# Patient Record
Sex: FEMALE | Race: BLACK OR AFRICAN AMERICAN | NOT HISPANIC OR LATINO | Employment: FULL TIME | URBAN - METROPOLITAN AREA
[De-identification: names, ages, dates, MRNs, and addresses within clinical notes are randomized per-mention and may not be internally consistent; named-entity substitution may affect disease eponyms.]

---

## 2023-03-16 DIAGNOSIS — E87.6 HYPOKALEMIA: ICD-10-CM

## 2023-03-19 RX ORDER — POTASSIUM CHLORIDE 1500 MG/1
TABLET, EXTENDED RELEASE ORAL
Qty: 90 TABLET | Refills: 0 | Status: SHIPPED | OUTPATIENT
Start: 2023-03-19 | End: 2023-04-03

## 2023-04-03 DIAGNOSIS — E87.6 HYPOKALEMIA: ICD-10-CM

## 2023-04-03 RX ORDER — POTASSIUM CHLORIDE 1500 MG/1
TABLET, EXTENDED RELEASE ORAL
Qty: 270 TABLET | Refills: 0 | Status: SHIPPED | OUTPATIENT
Start: 2023-04-03 | End: 2023-04-17 | Stop reason: SDUPTHER

## 2023-04-17 ENCOUNTER — OFFICE VISIT (OUTPATIENT)
Dept: PRIMARY CARE | Facility: CLINIC | Age: 63
End: 2023-04-17
Payer: COMMERCIAL

## 2023-04-17 VITALS
DIASTOLIC BLOOD PRESSURE: 78 MMHG | BODY MASS INDEX: 31.65 KG/M2 | OXYGEN SATURATION: 98 % | HEART RATE: 78 BPM | SYSTOLIC BLOOD PRESSURE: 118 MMHG | WEIGHT: 190 LBS | HEIGHT: 65 IN

## 2023-04-17 DIAGNOSIS — Z12.31 VISIT FOR SCREENING MAMMOGRAM: ICD-10-CM

## 2023-04-17 DIAGNOSIS — E87.6 HYPOKALEMIA: ICD-10-CM

## 2023-04-17 DIAGNOSIS — Z12.11 ENCOUNTER FOR SCREENING FOR MALIGNANT NEOPLASM OF COLON: ICD-10-CM

## 2023-04-17 DIAGNOSIS — E66.9 OBESITY (BMI 30-39.9): ICD-10-CM

## 2023-04-17 DIAGNOSIS — E55.9 VITAMIN D DEFICIENCY: ICD-10-CM

## 2023-04-17 DIAGNOSIS — I11.0 HYPERTENSIVE HEART DISEASE WITH HEART FAILURE (MULTI): ICD-10-CM

## 2023-04-17 DIAGNOSIS — Z00.00 ANNUAL PHYSICAL EXAM: Primary | ICD-10-CM

## 2023-04-17 DIAGNOSIS — I10 BENIGN ESSENTIAL HTN: ICD-10-CM

## 2023-04-17 PROBLEM — R87.619 ATYPICAL GLANDULAR CELLS OF UNDETERMINED SIGNIFICANCE (AGUS) ON CERVICAL PAP SMEAR: Status: ACTIVE | Noted: 2023-04-17

## 2023-04-17 PROCEDURE — 3078F DIAST BP <80 MM HG: CPT | Performed by: FAMILY MEDICINE

## 2023-04-17 PROCEDURE — 3074F SYST BP LT 130 MM HG: CPT | Performed by: FAMILY MEDICINE

## 2023-04-17 PROCEDURE — 1036F TOBACCO NON-USER: CPT | Performed by: FAMILY MEDICINE

## 2023-04-17 PROCEDURE — 99396 PREV VISIT EST AGE 40-64: CPT | Performed by: FAMILY MEDICINE

## 2023-04-17 RX ORDER — LISINOPRIL AND HYDROCHLOROTHIAZIDE 20; 25 MG/1; MG/1
1 TABLET ORAL DAILY
Qty: 90 TABLET | Refills: 3 | Status: SHIPPED | OUTPATIENT
Start: 2023-04-17 | End: 2024-03-11 | Stop reason: SDUPTHER

## 2023-04-17 RX ORDER — POTASSIUM CHLORIDE 20 MEQ/1
20 TABLET, EXTENDED RELEASE ORAL 3 TIMES DAILY
Qty: 270 TABLET | Refills: 3 | Status: SHIPPED | OUTPATIENT
Start: 2023-04-17 | End: 2024-03-11 | Stop reason: SDUPTHER

## 2023-04-17 RX ORDER — LISINOPRIL AND HYDROCHLOROTHIAZIDE 20; 25 MG/1; MG/1
1 TABLET ORAL DAILY
COMMUNITY
Start: 2016-06-16 | End: 2023-04-17 | Stop reason: SDUPTHER

## 2023-04-17 ASSESSMENT — PATIENT HEALTH QUESTIONNAIRE - PHQ9
SUM OF ALL RESPONSES TO PHQ9 QUESTIONS 1 AND 2: 0
2. FEELING DOWN, DEPRESSED OR HOPELESS: NOT AT ALL
1. LITTLE INTEREST OR PLEASURE IN DOING THINGS: NOT AT ALL

## 2023-04-17 ASSESSMENT — COLUMBIA-SUICIDE SEVERITY RATING SCALE - C-SSRS
6. HAVE YOU EVER DONE ANYTHING, STARTED TO DO ANYTHING, OR PREPARED TO DO ANYTHING TO END YOUR LIFE?: NO
1. IN THE PAST MONTH, HAVE YOU WISHED YOU WERE DEAD OR WISHED YOU COULD GO TO SLEEP AND NOT WAKE UP?: NO
2. HAVE YOU ACTUALLY HAD ANY THOUGHTS OF KILLING YOURSELF?: NO

## 2023-04-17 ASSESSMENT — ENCOUNTER SYMPTOMS
LOSS OF SENSATION IN FEET: 0
DEPRESSION: 0
OCCASIONAL FEELINGS OF UNSTEADINESS: 0

## 2023-04-17 NOTE — PROGRESS NOTES
"Subjective   Patient ID: Megan Wolf is a 62 y.o. female who presents for Annual Exam.    Last Annual Physical: 2021  Last Dental Visit: no recent visit  Last Eye exam: glasses, no recent visit  Hearing Concerns: none  Diet: well balanced  Exercise Routine: regular    Last PAP: Declined, knows she has a hx of abnormal PAP  Last Mammogram: ordered  Last Colonoscopy:  ordered      HPI     Review of Systems  Constitutional: No fever or chills, No Night Sweats  Eyes: No Blurry Vision or Eye sight problems  ENT: No Nasal Discharge, Hoarseness, sore throat  Cardiovascular: no chest pain, no palpitations and no syncope.   Respiratory: no cough, no shortness of breath during exertion and no shortness of breath at rest.   Gastrointestinal: no abdominal pain, no nausea and no vomiting.   : No vaginal discharge, burning with urination, no blood in urine or stools  Skin: No Skin rashes or Lesions  Neuro: No Headache, no dizziness or Numbness or tingling  Psych: No Anxiety, depression or sleeping problems  Heme: No Easy bleeding or brusing.     Objective   /78   Pulse 78   Ht 1.651 m (5' 5\")   Wt 86.2 kg (190 lb)   SpO2 98%   BMI 31.62 kg/m²     Physical Exam  Patient declined Chaperone  Constitutional: Alert and in no acute distress. Well developed, well nourished.   Head and Face: Head and face: Normal.    Eyes: Normal external exam. Pupils were equal in size, round, reactive to light (PERRL) with normal accommodation and extraocular movements intact (EOMI).   Ears, Nose, Mouth, and Throat: External inspection of ears and nose: Normal.  Hearing: Normal.  Nasal mucosa, septum, and turbinates: Normal.  Lips, teeth, and gums: Normal.  Oropharynx: Normal.   Neck: No neck mass was observed. Supple. Thyroid not enlarged and there were no palpable thyroid nodules.   Cardiovascular: Heart rate and rhythm were normal, normal S1 and S2. Pedal pulses: Normal. No peripheral edema.   Pulmonary: No respiratory distress. " "Clear bilateral breath sounds.   Breast: Normal Appearance, No Masses or lumps palpated  Abdomen: Soft nontender; no abdominal mass palpated. Normal bowel sounds. No organomegaly.   : Deferred   Musculoskeletal: No joint swelling seen, normal movements of all extremities. Range of motion: Normal.  Muscle strength/tone: Normal.    Skin: Normal skin color and pigmentation, normal skin turgor, and no rash.   Neurologic: Deep tendon reflexes were 2+ and symmetric.   Psychiatric: Judgment and insight: Intact. Mood and affect: Normal.  Lymphatic: No cervical lymphadenopathy. Palpation of lymph nodes in axillae: Normal.  Palpation of lymph nodes in groin: Normal.    Lab Results   Component Value Date    WBC 3.4 (L) 05/03/2021    HGB 12.8 05/03/2021    HCT 35.8 (L) 05/03/2021     05/03/2021    CHOL 200 (H) 01/28/2020    TRIG 122 01/28/2020    HDL 98.4 01/28/2020    ALT 18 01/28/2020    AST 27 01/28/2020     (L) 05/10/2021    K 4.7 05/10/2021    CL 97 (L) 05/10/2021    CREATININE 0.71 05/10/2021    BUN 19 05/10/2021    CO2 31 05/10/2021    TSH 1.62 01/28/2020       US HEAD NECK SOFT TISSUE  MRN: [id number]  Patient Name: MINERVA SCHAFER     STUDY:  US NECK MASS; ;  2/2/2023 1:48 pm     INDICATION:  posterior neck mass, r/o cyst  R22.1: Lump in neck.     COMPARISON:  None.     ACCESSION NUMBER(S):  [id number]     ORDERING CLINICIAN:  JUAN M LAWRENCE     TECHNIQUE:  Targeted grayscale and color Doppler ultrasound evaluation.     FINDINGS:  Evaluation is targeted to an area of clinical concern as indicated by  the patient in the ultrasound room. This corresponds with an area of  \"lump\" involving the \"midline posterior base of neck soft tissues.\"  In this area there are multiple adjacent abnormal appearing lymph  nodes, largest measures 1.1 x 0.5 x 1.1 cm. No other abnormality in  the area of concern on the submitted images. If any further imaging  evaluation is felt to be clinically indicated, then " a  contrast-enhanced soft tissue neck CT would be the most appropriate  imaging test for further assessment.     IMPRESSION:  As above.         Assessment/Plan   Diagnoses and all orders for this visit:  Annual physical exam  -     Hemoglobin A1C; Future  Obesity (BMI 30-39.9)  Benign essential HTN  -     lisinopriL-hydrochlorothiazide 20-25 mg tablet; Take 1 tablet by mouth once daily.  -     potassium chloride CR (Klor-Con M20) 20 mEq ER tablet; Take 1 tablet (20 mEq) by mouth in the morning and 1 tablet (20 mEq) in the evening and 1 tablet (20 mEq) before bedtime. Do not crush or chew..  -     CBC; Future  -     Comprehensive Metabolic Panel; Future  -     Lipid Panel; Future  -     TSH with reflex to Free T4 if abnormal; Future  Hypokalemia  -     potassium chloride CR (Klor-Con M20) 20 mEq ER tablet; Take 1 tablet (20 mEq) by mouth in the morning and 1 tablet (20 mEq) in the evening and 1 tablet (20 mEq) before bedtime. Do not crush or chew..  Visit for screening mammogram  -     BI mammo bilateral screening tomosynthesis; Future  Encounter for screening for malignant neoplasm of colon  -     Colonoscopy; Future  Vitamin D deficiency  -     Vitamin B12; Future  -     Vitamin D, Total; Future  Hypertensive heart disease with heart failure (CMS/HCC)        Dear Megan Wolf     It was my pleasure to take care of you today in the office. Below are the things we discussed today:    1. 1. Immunizations: Yearly Flu shot is recommended.          a: COVID: Up-to-Date         b: Tetanus: Up-to-date         c: Shingrix: please come back for it     2. Blood Work: ordered  3. Seen your dentist twice a year  4. Yearly Eye exam is recommended    5. BMI: obese  6: Diet recommendations:   Eat Clean, Try to have as many home cooked meals as possible  Avoid processed foods which contain excess calories, sugar, and sodium.    7. Exercise recommendations:   150 minutes a week to maintain your weight     If you have to loose  weight, you need a better diet and exercise plan.     8. Supplements recommended:  a - Calcium 600 mg up to twice a day to get a total of 1200 mg. Each 8 oz of milk or yogurt or 1 oz of cheese, 1 Banana, 1 serving of green Leafy vegetable has about 300 mg of Calcium, so you may subtract that amount. Calcium citrate is the only acceptable supplement to take if you take an acid suppressing medication like Prilosec; otherwise Calcium carbonate is acceptable too (It can cause Constipation).   b - Vitamin D - 2000 IU daily     9. Please get your Living will / Advance directive completed if you do not have one already. Please make sure our office has a copy of the latest one.     10. Colonoscopy: ordered  11. Mammogram: ordered  12. PAP: declined  13. Skin Check: Please see Dermatology once a year for a Skin Check.     14. HTN - Continue Lisinopril/hydrochlorothiazide      Follow up in one year for a Physical. Please call the office before your Physical to see if you need blood work completed prior to your physical.     Please call me if any questions arise from now until your next visit. I will call you after I am done seeing patients. A Doctor is always available by phone when the office is closed. Please feel free to call for help with any problem that you feel shouldn't wait until the office re-opens.     Ale Callejas MD

## 2024-03-11 ENCOUNTER — OFFICE VISIT (OUTPATIENT)
Dept: PRIMARY CARE | Facility: CLINIC | Age: 64
End: 2024-03-11
Payer: COMMERCIAL

## 2024-03-11 VITALS
WEIGHT: 177 LBS | DIASTOLIC BLOOD PRESSURE: 63 MMHG | HEART RATE: 96 BPM | SYSTOLIC BLOOD PRESSURE: 99 MMHG | BODY MASS INDEX: 28.45 KG/M2 | HEIGHT: 66 IN

## 2024-03-11 DIAGNOSIS — E87.6 HYPOKALEMIA: ICD-10-CM

## 2024-03-11 DIAGNOSIS — R73.9 ELEVATED BLOOD SUGAR: ICD-10-CM

## 2024-03-11 DIAGNOSIS — I11.0 HYPERTENSIVE HEART DISEASE WITH HEART FAILURE (MULTI): ICD-10-CM

## 2024-03-11 DIAGNOSIS — I10 BENIGN ESSENTIAL HTN: Primary | ICD-10-CM

## 2024-03-11 DIAGNOSIS — Z12.11 ENCOUNTER FOR SCREENING FOR MALIGNANT NEOPLASM OF COLON: ICD-10-CM

## 2024-03-11 DIAGNOSIS — E55.9 VITAMIN D DEFICIENCY: ICD-10-CM

## 2024-03-11 PROBLEM — E66.9 OBESITY (BMI 30-39.9): Status: RESOLVED | Noted: 2023-04-17 | Resolved: 2024-03-11

## 2024-03-11 PROCEDURE — 3074F SYST BP LT 130 MM HG: CPT | Performed by: FAMILY MEDICINE

## 2024-03-11 PROCEDURE — 1036F TOBACCO NON-USER: CPT | Performed by: FAMILY MEDICINE

## 2024-03-11 PROCEDURE — 3078F DIAST BP <80 MM HG: CPT | Performed by: FAMILY MEDICINE

## 2024-03-11 PROCEDURE — 99214 OFFICE O/P EST MOD 30 MIN: CPT | Performed by: FAMILY MEDICINE

## 2024-03-11 RX ORDER — POTASSIUM CHLORIDE 20 MEQ/1
20 TABLET, EXTENDED RELEASE ORAL 3 TIMES DAILY
Qty: 270 TABLET | Refills: 0 | Status: SHIPPED | OUTPATIENT
Start: 2024-03-11

## 2024-03-11 RX ORDER — LISINOPRIL AND HYDROCHLOROTHIAZIDE 20; 25 MG/1; MG/1
1 TABLET ORAL DAILY
Qty: 90 TABLET | Refills: 0 | Status: SHIPPED | OUTPATIENT
Start: 2024-03-11 | End: 2024-05-28

## 2024-03-11 ASSESSMENT — PATIENT HEALTH QUESTIONNAIRE - PHQ9
SUM OF ALL RESPONSES TO PHQ9 QUESTIONS 1 AND 2: 0
1. LITTLE INTEREST OR PLEASURE IN DOING THINGS: NOT AT ALL
2. FEELING DOWN, DEPRESSED OR HOPELESS: NOT AT ALL

## 2024-03-11 NOTE — PROGRESS NOTES
"Subjective   Patient ID: Megan Wolf is a 63 y.o. female who presents for Follow-up (Blood Pressure).    HPI   The patient reports that she is taking lisinopril- hydrochlorothiazide for her hypertension and her blood pressure is well- controlled at this time. She is also taking potassium supplements for hypokalemia. She denies any lightheadedness of dizziness.     Review of Systems  Constitutional: No fever or chills  Cardiovascular: no chest pain, no palpitations and no syncope.   Respiratory: no cough, no shortness of breath during exertion and no shortness of breath at rest.   Gastrointestinal: no abdominal pain, no nausea and no vomiting.  Neuro: No Headache, no dizziness    Objective   BP 99/63   Pulse 96   Ht 1.676 m (5' 6\")   Wt 80.3 kg (177 lb)   BMI 28.57 kg/m²     Physical Exam  Constitutional: Alert and in no acute distress. Well developed, well nourished  Head and Face: Head and face: Normal.    Cardiovascular: Heart rate and rhythm were normal, normal S1 and S2. No peripheral edema.   Pulmonary: No respiratory distress. Clear bilateral breath sounds.  Musculoskeletal: Gait and station: Normal. Muscle strength/tone: Normal.   Skin: Normal skin color and pigmentation, normal skin turgor, and no rash.    Psychiatric: Judgment and insight: Intact. Mood and affect: Normal.    Lab Results   Component Value Date    WBC 3.4 (L) 05/03/2021    HGB 12.8 05/03/2021    HCT 35.8 (L) 05/03/2021     05/03/2021    CHOL 200 (H) 01/28/2020    TRIG 122 01/28/2020    HDL 98.4 01/28/2020    ALT 18 01/28/2020    AST 27 01/28/2020     (L) 05/10/2021    K 4.7 05/10/2021    CL 97 (L) 05/10/2021    CREATININE 0.71 05/10/2021    BUN 19 05/10/2021    CO2 31 05/10/2021    TSH 1.62 01/28/2020       US HEAD NECK SOFT TISSUE  MRN: [id number]  Patient Name: MEGAN WOLF     STUDY:  US NECK MASS; ;  2/2/2023 1:48 pm     INDICATION:  posterior neck mass, r/o cyst  R22.1: Lump in neck.     COMPARISON:  None.   " "  ACCESSION NUMBER(S):  [id number]     ORDERING CLINICIAN:  JUAN M LAWRENCE     TECHNIQUE:  Targeted grayscale and color Doppler ultrasound evaluation.     FINDINGS:  Evaluation is targeted to an area of clinical concern as indicated by  the patient in the ultrasound room. This corresponds with an area of  \"lump\" involving the \"midline posterior base of neck soft tissues.\"  In this area there are multiple adjacent abnormal appearing lymph  nodes, largest measures 1.1 x 0.5 x 1.1 cm. No other abnormality in  the area of concern on the submitted images. If any further imaging  evaluation is felt to be clinically indicated, then a  contrast-enhanced soft tissue neck CT would be the most appropriate  imaging test for further assessment.     IMPRESSION:  As above.         Assessment/Plan   Diagnoses and all orders for this visit:  Benign essential HTN  -     lisinopriL-hydrochlorothiazide 20-25 mg tablet; Take 1 tablet by mouth once daily.  -     potassium chloride CR 20 mEq ER tablet; Take 1 tablet (20 mEq) by mouth 3 times a day. Do not crush or chew.  -     Albumin , Urine Random; Future  -     CBC; Future  -     Comprehensive Metabolic Panel; Future  -     Lipid Panel; Future  -     Follow Up In [facility] - PCP - Health Maintenance; Future  Hypertensive heart disease with heart failure (CMS/HCC)  -     Lipoprotein a; Future  Hypokalemia  -     potassium chloride CR 20 mEq ER tablet; Take 1 tablet (20 mEq) by mouth 3 times a day. Do not crush or chew.  Vitamin D deficiency  -     Vitamin B12; Future  -     Vitamin D 25-Hydroxy,Total (for eval of Vitamin D levels); Future  Encounter for screening for malignant neoplasm of colon  -     TSH with reflex to Free T4 if abnormal; Future  Elevated blood sugar  -     Hemoglobin A1C; Future        Dear Megan Wolf     It was my pleasure to take care of you today in the office. Below are the things we discussed today:    1. Hypertension: Continue lisinopril- " hydrochlorothiazide.    2. Hypokalemia: Continue potassium supplements.    3. Heart failure from HTN: Resolved.     Your yearly Physical is due in: June 2024  When you call the office for your yearly Physical, please ask them to inform me to order your blood work, so that you can get the fasting blood work before your appointment and we can discuss the results at your physical.      Please call me if any questions arise from now until your next visit. I will call you after I am done seeing patients. A Doctor is always available by phone when the office is closed. Please feel free to call for help with any problem that you feel shouldn't wait until the office re-opens.     Scribe Attestation  By signing my name below, IChacha Scribe   attest that this documentation has been prepared under the direction and in the presence of Ale Callejas MD.

## 2024-04-22 ENCOUNTER — OFFICE VISIT (OUTPATIENT)
Dept: ORTHOPEDIC SURGERY | Facility: CLINIC | Age: 64
End: 2024-04-22
Payer: COMMERCIAL

## 2024-04-22 DIAGNOSIS — M17.11 PRIMARY OSTEOARTHRITIS OF RIGHT KNEE: Primary | ICD-10-CM

## 2024-04-22 PROCEDURE — 20610 DRAIN/INJ JOINT/BURSA W/O US: CPT | Performed by: ORTHOPAEDIC SURGERY

## 2024-04-22 PROCEDURE — 99214 OFFICE O/P EST MOD 30 MIN: CPT | Performed by: ORTHOPAEDIC SURGERY

## 2024-04-22 PROCEDURE — 2500000004 HC RX 250 GENERAL PHARMACY W/ HCPCS (ALT 636 FOR OP/ED): Performed by: ORTHOPAEDIC SURGERY

## 2024-04-22 PROCEDURE — 2500000005 HC RX 250 GENERAL PHARMACY W/O HCPCS: Performed by: ORTHOPAEDIC SURGERY

## 2024-04-22 RX ORDER — TRIAMCINOLONE ACETONIDE 40 MG/ML
1 INJECTION, SUSPENSION INTRA-ARTICULAR; INTRAMUSCULAR
Status: COMPLETED | OUTPATIENT
Start: 2024-04-22 | End: 2024-04-22

## 2024-04-22 RX ORDER — LIDOCAINE HYDROCHLORIDE 10 MG/ML
2 INJECTION INFILTRATION; PERINEURAL
Status: COMPLETED | OUTPATIENT
Start: 2024-04-22 | End: 2024-04-22

## 2024-04-22 RX ADMIN — TRIAMCINOLONE ACETONIDE 1 ML: 40 INJECTION, SUSPENSION INTRA-ARTICULAR; INTRAMUSCULAR at 15:55

## 2024-04-22 RX ADMIN — LIDOCAINE HYDROCHLORIDE 2 ML: 10 INJECTION, SOLUTION INFILTRATION; PERINEURAL at 15:55

## 2024-04-22 NOTE — PROGRESS NOTES
This 63-year-old woman was seen with her mother for continuing pain in her right knee.  The patient notes the pain in her right knee and loss of function is interfering with her activities of daily living.  She notes the pain is quite severe.  Patient notes the intra-articular steroid injection that I gave her a year ago only lasted for several months.  She notes progressive generalized pain and stiffness in the knee.    The patient denies any neurologic symptoms in the lower extremity.    Complicating the patient's situation is her past medical history that includes hypertensive heart disease with heart failure and anemia.  Patient's BMI is 28.57.    Review of systems:  A complete review of the patient's past medical history and review of 30 systems and all medications and allergies was performed. Please see adult medical record for details.    A Family history for genetic or familial inheritance issues and Social history including smoking history, alcohol consumption and exercise activities was also reviewed and significant findings noted in the patients history of present illness.    Physical Exam:  The patient is well-nourished and well-developed and in no acute distress. The patient displayed normal mood and affect. The patient's pupils were equal, round sclerae are white. Patient's respirations appear to be regular and unlabored.     Physical examination of the right knee revealed no effusion in the knee and there were no skin abnormalities or lymphangitis. The knee demonstrated varus alignment. There was no tenderness about the knee, thigh or calf. There was marked tenderness at the medial joint space. There was pain and crepitus with patellofemoral compression. The knee came to within 20 degrees of full extension.  There was pain and crepitus with range of motion of the knee.  Straight leg raising was without lag, flexion was to 95 degrees and ligamentous stability was full. The neurovascular examination  extremity was intact.The neurological exam including motor and sensory exam was performed. The vascular examination including palpation of pulses and capillary refill of the foot was performed and determined to be intact.    Imaging:  I personally reviewed and measured the radiographs including AP and lateral views of the extremity and they revealed severe tricompartmental osteoarthritis with total loss of the medial compartment and varus deformity.    Impression & Plan:   It is my impression this patient has severe osteoarthritis of her knee with marked varus deformity and marked loss of motion.  Unfortunately the patient has lost significant motion over the last year when she could flex to 120 degrees.  She is now limited to 95 degrees and continues with a 20 degree flexion contracture.    I had a long discussion with the patient and her mother.  I would recommend a right total knee replacement.  I have explained to the patient that the best predictor of postop range of motion is preop range of motion and the patient has lost a significant amount of motion in her knee.    The patient understands that we cannot do a total knee replacement if we perform an intra-articular steroid injection today for at least 3 months.  The patient does not want to have her knee surgery until August.    After adequate informed consent I injected the right knee with 40 mg of Kenalog and local anesthesia.    I would recommend we proceed with right total knee replacement utilizing Frandy implants in August.    I talked with the patient at length about risks, limitations, benefits and alternatives to total knee replacement today.  The patient has identified their  personal goals of their joint replacement surgery and recovery and we have discussed them.  The basic concepts of the joint replacement procedure have been reviewed with the patient and the patient has been provided the opportunity to see the implant in the office, in our  educationbal materials or in our preop educational class.  Under my care or the care of previous providers, the patient has had a reasonable trial of nonoperative treatment for their knee problem including NSAIDS, tylenol  or other analgesics, cortisone injections or viscosupplementation,  activity modification and activity restriction and use of assistive devices.  These  previous treatments have not provided the patient with durable relief of their symptoms. The patient is not an appropriate candidate for additional physical therapy at this time. Despite the above, patient has had pain and symptomatic functional impairment that either interferes with their sleep or ADLs and quality of life.  I reviewed concerns about implant wear, loosening, breakage, infection and infection prophylaxis, DVT, PE, death and other medical and anesthetic complications of surgery. We talked about the potential for persistent pain following surgery since there are many possible causes for knee and leg pain. The patient was advised that knee replacement will only relieve pain that is coming from the knee. We talked about limited range of motion following knee replacement and the importance of physical therapy and their motivation to perform their exercises daily even when the knee is painful. We talked about improvements in pain management post-operatively and our accelerated rehab program. We talked about wound healing issues and neurovascular complications of surgery. I reviewed functional and activity restrictions in detail.  We discussed the fact that many of our patients are able to go home the day of surgery or in 1- 2 days depending on their health, mobility, pre-op preparation, individual home situation and personal preference. The patient should take our pre-operative teaching class either electronically or live education. All of the patients questions were answered. The patient can call my office to schedule surgery. I told  the patient that they should contact their primary care physician to discuss fitness for surgery.    Note dictated with [employer] software.  Completed without full type editing and sent to avoid delay.

## 2024-04-22 NOTE — LETTER
April 22, 2024     Ale Callejas MD  [address]  [address]  [facility] [address]    Patient: Megan Wolf   YOB: 1960   Date of Visit: 4/22/2024       Dear Dr. Ale Callejas MD:    Thank you for referring Megan Wolf to me for evaluation. Below are my notes for this consultation.  If you have questions, please do not hesitate to call me. I look forward to following your patient along with you.       Sincerely,     Ian Montero MD      CC: No Recipients  ______________________________________________________________________________________    Patient ID: Megan Wolf is a 63 y.o. female.    L Inj/Asp: R knee on 4/22/2024 3:55 PM  Indications: pain  Details: 20 G needle, anterior approach  Medications: 1 mL triamcinolone acetonide 40 mg/mL; 2 mL lidocaine 10 mg/mL (1 %)  Outcome: tolerated well, no immediate complications  Procedure, treatment alternatives, risks and benefits explained, specific risks discussed. Consent was given by the patient. Patient was prepped and draped in the usual sterile fashion.           This 63-year-old woman was seen with her mother for continuing pain in her right knee.  The patient notes the pain in her right knee and loss of function is interfering with her activities of daily living.  She notes the pain is quite severe.  Patient notes the intra-articular steroid injection that I gave her a year ago only lasted for several months.  She notes progressive generalized pain and stiffness in the knee.    The patient denies any neurologic symptoms in the lower extremity.    Complicating the patient's situation is her past medical history that includes hypertensive heart disease with heart failure and anemia.  Patient's BMI is 28.57.    Review of systems:  A complete review of the patient's past medical history and review of 30 systems and all medications and allergies was performed. Please see adult medical record for details.    A Family history  for genetic or familial inheritance issues and Social history including smoking history, alcohol consumption and exercise activities was also reviewed and significant findings noted in the patients history of present illness.    Physical Exam:  The patient is well-nourished and well-developed and in no acute distress. The patient displayed normal mood and affect. The patient's pupils were equal, round sclerae are white. Patient's respirations appear to be regular and unlabored.     Physical examination of the right knee revealed no effusion in the knee and there were no skin abnormalities or lymphangitis. The knee demonstrated varus alignment. There was no tenderness about the knee, thigh or calf. There was marked tenderness at the medial joint space. There was pain and crepitus with patellofemoral compression. The knee came to within 20 degrees of full extension.  There was pain and crepitus with range of motion of the knee.  Straight leg raising was without lag, flexion was to 95 degrees and ligamentous stability was full. The neurovascular examination extremity was intact.The neurological exam including motor and sensory exam was performed. The vascular examination including palpation of pulses and capillary refill of the foot was performed and determined to be intact.    Imaging:  I personally reviewed and measured the radiographs including AP and lateral views of the extremity and they revealed severe tricompartmental osteoarthritis with total loss of the medial compartment and varus deformity.    Impression & Plan:   It is my impression this patient has severe osteoarthritis of her knee with marked varus deformity and marked loss of motion.  Unfortunately the patient has lost significant motion over the last year when she could flex to 120 degrees.  She is now limited to 95 degrees and continues with a 20 degree flexion contracture.    I had a long discussion with the patient and her mother.  I would recommend  a right total knee replacement.  I have explained to the patient that the best predictor of postop range of motion is preop range of motion and the patient has lost a significant amount of motion in her knee.    The patient understands that we cannot do a total knee replacement if we perform an intra-articular steroid injection today for at least 3 months.  The patient does not want to have her knee surgery until August.    After adequate informed consent I injected the right knee with 40 mg of Kenalog and local anesthesia.    I would recommend we proceed with right total knee replacement utilizing [place] implants in August.    I talked with the patient at length about risks, limitations, benefits and alternatives to total knee replacement today.  The patient has identified their  personal goals of their joint replacement surgery and recovery and we have discussed them.  The basic concepts of the joint replacement procedure have been reviewed with the patient and the patient has been provided the opportunity to see the implant in the office, in our educationbal materials or in our preop educational class.  Under my care or the care of previous providers, the patient has had a reasonable trial of nonoperative treatment for their knee problem including NSAIDS, tylenol  or other analgesics, cortisone injections or viscosupplementation,  activity modification and activity restriction and use of assistive devices.  These  previous treatments have not provided the patient with durable relief of their symptoms. The patient is not an appropriate candidate for additional physical therapy at this time. Despite the above, patient has had pain and symptomatic functional impairment that either interferes with their sleep or ADLs and quality of life.  I reviewed concerns about implant wear, loosening, breakage, infection and infection prophylaxis, DVT, PE, death and other medical and anesthetic complications of surgery. We  talked about the potential for persistent pain following surgery since there are many possible causes for knee and leg pain. The patient was advised that knee replacement will only relieve pain that is coming from the knee. We talked about limited range of motion following knee replacement and the importance of physical therapy and their motivation to perform their exercises daily even when the knee is painful. We talked about improvements in pain management post-operatively and our accelerated rehab program. We talked about wound healing issues and neurovascular complications of surgery. I reviewed functional and activity restrictions in detail.  We discussed the fact that many of our patients are able to go home the day of surgery or in 1- 2 days depending on their health, mobility, pre-op preparation, individual home situation and personal preference. The patient should take our pre-operative teaching class either electronically or live education. All of the patients questions were answered. The patient can call my office to schedule surgery. I told the patient that they should contact their primary care physician to discuss fitness for surgery.    Note dictated with shopandsave software.  Completed without full type editing and sent to avoid delay.

## 2024-04-22 NOTE — PROGRESS NOTES
Patient ID: Megan Wolf is a 63 y.o. female.    L Inj/Asp: R knee on 4/22/2024 3:55 PM  Indications: pain  Details: 20 G needle, anterior approach  Medications: 1 mL triamcinolone acetonide 40 mg/mL; 2 mL lidocaine 10 mg/mL (1 %)  Outcome: tolerated well, no immediate complications  Procedure, treatment alternatives, risks and benefits explained, specific risks discussed. Consent was given by the patient. Patient was prepped and draped in the usual sterile fashion.

## 2024-05-22 PROBLEM — M17.11 UNILATERAL PRIMARY OSTEOARTHRITIS, RIGHT KNEE: Status: ACTIVE | Noted: 2024-05-22

## 2024-05-24 DIAGNOSIS — M17.11 UNILATERAL PRIMARY OSTEOARTHRITIS, RIGHT KNEE: ICD-10-CM

## 2024-05-27 DIAGNOSIS — I10 BENIGN ESSENTIAL HTN: ICD-10-CM

## 2024-05-28 RX ORDER — LISINOPRIL AND HYDROCHLOROTHIAZIDE 20; 25 MG/1; MG/1
1 TABLET ORAL DAILY
Qty: 30 TABLET | Refills: 0 | Status: SHIPPED | OUTPATIENT
Start: 2024-05-28 | End: 2024-05-31

## 2024-05-29 DIAGNOSIS — I10 BENIGN ESSENTIAL HTN: ICD-10-CM

## 2024-05-31 RX ORDER — LISINOPRIL AND HYDROCHLOROTHIAZIDE 20; 25 MG/1; MG/1
1 TABLET ORAL DAILY
Qty: 90 TABLET | Refills: 0 | Status: SHIPPED | OUTPATIENT
Start: 2024-05-31

## 2024-06-17 ENCOUNTER — HOSPITAL ENCOUNTER (EMERGENCY)
Facility: HOSPITAL | Age: 64
Discharge: HOME | End: 2024-06-17
Payer: COMMERCIAL

## 2024-06-17 ENCOUNTER — PHARMACY VISIT (OUTPATIENT)
Dept: PHARMACY | Facility: CLINIC | Age: 64
End: 2024-06-17
Payer: COMMERCIAL

## 2024-06-17 ENCOUNTER — APPOINTMENT (OUTPATIENT)
Dept: RADIOLOGY | Facility: HOSPITAL | Age: 64
End: 2024-06-17
Payer: COMMERCIAL

## 2024-06-17 ENCOUNTER — APPOINTMENT (OUTPATIENT)
Dept: PRIMARY CARE | Facility: CLINIC | Age: 64
End: 2024-06-17
Payer: COMMERCIAL

## 2024-06-17 VITALS
SYSTOLIC BLOOD PRESSURE: 146 MMHG | TEMPERATURE: 97.7 F | DIASTOLIC BLOOD PRESSURE: 87 MMHG | OXYGEN SATURATION: 100 % | RESPIRATION RATE: 16 BRPM | HEART RATE: 80 BPM

## 2024-06-17 DIAGNOSIS — M79.672 LEFT FOOT PAIN: Primary | ICD-10-CM

## 2024-06-17 DIAGNOSIS — M19.079 ARTHRITIS OF BIG TOE: ICD-10-CM

## 2024-06-17 PROCEDURE — 73630 X-RAY EXAM OF FOOT: CPT | Mod: LT

## 2024-06-17 PROCEDURE — 2500000004 HC RX 250 GENERAL PHARMACY W/ HCPCS (ALT 636 FOR OP/ED)

## 2024-06-17 PROCEDURE — 99284 EMERGENCY DEPT VISIT MOD MDM: CPT

## 2024-06-17 PROCEDURE — RXMED WILLOW AMBULATORY MEDICATION CHARGE

## 2024-06-17 PROCEDURE — 73630 X-RAY EXAM OF FOOT: CPT | Mod: LEFT SIDE | Performed by: RADIOLOGY

## 2024-06-17 PROCEDURE — 73610 X-RAY EXAM OF ANKLE: CPT | Mod: LEFT SIDE | Performed by: RADIOLOGY

## 2024-06-17 PROCEDURE — 73610 X-RAY EXAM OF ANKLE: CPT | Mod: LT

## 2024-06-17 PROCEDURE — 96372 THER/PROPH/DIAG INJ SC/IM: CPT

## 2024-06-17 RX ORDER — KETOROLAC TROMETHAMINE 15 MG/ML
15 INJECTION, SOLUTION INTRAMUSCULAR; INTRAVENOUS ONCE
Status: COMPLETED | OUTPATIENT
Start: 2024-06-17 | End: 2024-06-17

## 2024-06-17 RX ORDER — NAPROXEN 500 MG/1
500 TABLET ORAL 2 TIMES DAILY PRN
Qty: 30 TABLET | Refills: 0 | Status: SHIPPED | OUTPATIENT
Start: 2024-06-17

## 2024-06-17 ASSESSMENT — PAIN DESCRIPTION - ORIENTATION: ORIENTATION: LEFT

## 2024-06-17 ASSESSMENT — PAIN DESCRIPTION - PAIN TYPE: TYPE: ACUTE PAIN

## 2024-06-17 ASSESSMENT — PAIN SCALES - GENERAL: PAINLEVEL_OUTOF10: 10 - WORST POSSIBLE PAIN

## 2024-06-17 ASSESSMENT — PAIN - FUNCTIONAL ASSESSMENT: PAIN_FUNCTIONAL_ASSESSMENT: 0-10

## 2024-06-17 ASSESSMENT — PAIN DESCRIPTION - LOCATION: LOCATION: FOOT

## 2024-06-17 NOTE — ED TRIAGE NOTES
Pt complains of left foot pain that started this morning. Pt states that she had no pain in this foot yesterday and was able to ambulate. Pt woke up this morning and was barely able to put any weight on it. Pt denies any fall or injury to the foot. PT has PMHX of HTN

## 2024-06-17 NOTE — DISCHARGE INSTRUCTIONS
X-rays showed no fracture.  There is a moderate amount of arthritis noted in your great toe.  You also have heel spurs noted.  Use naproxen as needed for pain.  If symptoms persist, I did put in a sports medicine referral.

## 2024-06-17 NOTE — ED PROVIDER NOTES
HPI   Chief Complaint   Patient presents with    Foot Injury       Patient is a 63-year-old female with past medical history of HTN and CHF presenting today for left foot pain.  Reports that she worked overtime this weekend both Saturday and Sunday.  Went to bed last night and woke up with acute onset of left foot pain.  Reports that the pain is lateral and anterior pain along her fourth and fifth metatarsals.  No pain along her heel or arch of her foot.  Reports no shooting pain, no numbness or tingling.  Reports that the pain is only present when bearing weight.  She reports no trauma to her foot.  Has never had pain like this before.  Denies any swelling to her left lower extremity                          No data recorded                   Patient History   Past Medical History:   Diagnosis Date    Body mass index (BMI)30.0-30.9, adult 08/27/2021    Body mass index (BMI) of 30.0 to 30.9 in adult    Obesity, unspecified 08/27/2021    Obesity (BMI 30.0-34.9)    Other conditions influencing health status     No significant past medical history     No past surgical history on file.  Family History   Problem Relation Name Age of Onset    No Known Problems Mother      No Known Problems Father       Social History     Tobacco Use    Smoking status: Never     Passive exposure: Never    Smokeless tobacco: Never   Vaping Use    Vaping status: Never Used   Substance Use Topics    Alcohol use: Not Currently    Drug use: Never       Physical Exam   ED Triage Vitals [06/17/24 1146]   Temperature Heart Rate Respirations BP   36.5 °C (97.7 °F) 80 16 146/87      Pulse Ox Temp Source Heart Rate Source Patient Position   100 % Tympanic Monitor Lying      BP Location FiO2 (%)     Right arm --       Physical Exam  Vitals and nursing note reviewed.   Constitutional:       General: She is not in acute distress.     Appearance: Normal appearance. She is not ill-appearing.   HENT:      Head: Normocephalic and atraumatic.      Right Ear:  External ear normal.      Left Ear: External ear normal.      Nose: Nose normal. No congestion or rhinorrhea.      Mouth/Throat:      Mouth: Mucous membranes are moist.      Pharynx: Oropharynx is clear. No oropharyngeal exudate or posterior oropharyngeal erythema.   Eyes:      Extraocular Movements: Extraocular movements intact.      Conjunctiva/sclera: Conjunctivae normal.      Pupils: Pupils are equal, round, and reactive to light.   Cardiovascular:      Rate and Rhythm: Normal rate and regular rhythm.      Heart sounds: Normal heart sounds.   Pulmonary:      Effort: No accessory muscle usage or respiratory distress.      Breath sounds: Normal breath sounds. No wheezing, rhonchi or rales.   Abdominal:      General: Abdomen is flat. Bowel sounds are normal. There is no distension.      Palpations: Abdomen is soft.      Tenderness: There is no abdominal tenderness. There is no right CVA tenderness or left CVA tenderness.   Musculoskeletal:         General: No swelling or deformity. Normal range of motion.      Cervical back: Normal range of motion and neck supple.      Right lower leg: No edema.      Left lower leg: No edema.      Comments: Nontender left foot on palpation, no swelling, pedal pulses palpated 3+, sensation intact.  Reports pain along her fourth and fifth metatarsal though not reproducible.  No pain to palpation of the arch or heel.  No lower extremity swelling in her calf.  No erythema or wounds noted to foot.   Skin:     General: Skin is warm and dry.      Capillary Refill: Capillary refill takes less than 2 seconds.   Neurological:      General: No focal deficit present.      Mental Status: She is alert and oriented to person, place, and time.      GCS: GCS eye subscore is 4. GCS verbal subscore is 5. GCS motor subscore is 6.      Cranial Nerves: Cranial nerves 2-12 are intact.      Sensory: No sensory deficit.      Motor: Motor function is intact. No weakness.   Psychiatric:         Mood and  "Affect: Mood and affect normal.         Speech: Speech normal.         Behavior: Behavior normal. Behavior is cooperative.         ED Course & MDM   Diagnoses as of 06/17/24 1327   Left foot pain   Arthritis of big toe       Medical Decision Making  The patient is a 63-year-old female presenting today for left foot pain.  Reports that she \"overdid it\" at work and was working overtime this weekend.  She states that she felt fine going to bed but when she woke she had increased pain only while walking on her foot along her fourth and fifth metatarsals.  On my exam, no swelling, pulses are intact, sensation is intact.  Lower concern for DVT or arterial occlusion.  No swelling noted to the joint or findings of joint effusion.  Less concern for gout or septic arthritis.  Pain is nonreproducible on my exam.  No pain along the heel or arch to be consistent with plantars fasciitis.  X-rays did show osteoarthritis of the left great toe and findings of Plantar spurs and Achilles enthesophytes.  This is not consistent with her reported pain.  Discussed NSAID trial and follow-up with sports medicine if symptoms do not resolve.  Patient was also given strict return precautions back to the ER.        Procedure  Procedures     Mavis Bond PA-C  06/17/24 1339    "

## 2024-06-17 NOTE — Clinical Note
Megan Wolf was seen and treated in our emergency department on 6/17/2024.  She may return to work on 06/19/2024.       If you have any questions or concerns, please don't hesitate to call.      Mavis Bond PA-C

## 2024-06-18 ENCOUNTER — OFFICE VISIT (OUTPATIENT)
Dept: PRIMARY CARE | Facility: CLINIC | Age: 64
End: 2024-06-18
Payer: COMMERCIAL

## 2024-06-18 ENCOUNTER — LAB (OUTPATIENT)
Dept: LAB | Facility: LAB | Age: 64
End: 2024-06-18
Payer: COMMERCIAL

## 2024-06-18 VITALS — SYSTOLIC BLOOD PRESSURE: 131 MMHG | HEART RATE: 73 BPM | DIASTOLIC BLOOD PRESSURE: 81 MMHG | OXYGEN SATURATION: 98 %

## 2024-06-18 DIAGNOSIS — Z12.11 ENCOUNTER FOR SCREENING FOR MALIGNANT NEOPLASM OF COLON: ICD-10-CM

## 2024-06-18 DIAGNOSIS — E55.9 VITAMIN D DEFICIENCY: ICD-10-CM

## 2024-06-18 DIAGNOSIS — M79.672 LEFT FOOT PAIN: ICD-10-CM

## 2024-06-18 DIAGNOSIS — R73.9 ELEVATED BLOOD SUGAR: ICD-10-CM

## 2024-06-18 DIAGNOSIS — I11.0 HYPERTENSIVE HEART DISEASE WITH HEART FAILURE (MULTI): ICD-10-CM

## 2024-06-18 DIAGNOSIS — I10 BENIGN ESSENTIAL HTN: ICD-10-CM

## 2024-06-18 DIAGNOSIS — M79.672 LEFT FOOT PAIN: Primary | ICD-10-CM

## 2024-06-18 PROCEDURE — 85652 RBC SED RATE AUTOMATED: CPT

## 2024-06-18 PROCEDURE — 36415 COLL VENOUS BLD VENIPUNCTURE: CPT

## 2024-06-18 PROCEDURE — 3079F DIAST BP 80-89 MM HG: CPT | Performed by: FAMILY MEDICINE

## 2024-06-18 PROCEDURE — 1036F TOBACCO NON-USER: CPT | Performed by: FAMILY MEDICINE

## 2024-06-18 PROCEDURE — 80061 LIPID PANEL: CPT

## 2024-06-18 PROCEDURE — 80053 COMPREHEN METABOLIC PANEL: CPT

## 2024-06-18 PROCEDURE — 84550 ASSAY OF BLOOD/URIC ACID: CPT

## 2024-06-18 PROCEDURE — 84443 ASSAY THYROID STIM HORMONE: CPT

## 2024-06-18 PROCEDURE — 86140 C-REACTIVE PROTEIN: CPT

## 2024-06-18 PROCEDURE — 99214 OFFICE O/P EST MOD 30 MIN: CPT | Performed by: FAMILY MEDICINE

## 2024-06-18 PROCEDURE — 85025 COMPLETE CBC W/AUTO DIFF WBC: CPT

## 2024-06-18 PROCEDURE — 82607 VITAMIN B-12: CPT

## 2024-06-18 PROCEDURE — 3075F SYST BP GE 130 - 139MM HG: CPT | Performed by: FAMILY MEDICINE

## 2024-06-18 PROCEDURE — 82306 VITAMIN D 25 HYDROXY: CPT

## 2024-06-18 PROCEDURE — 83036 HEMOGLOBIN GLYCOSYLATED A1C: CPT

## 2024-06-18 PROCEDURE — 82172 ASSAY OF APOLIPOPROTEIN: CPT

## 2024-06-18 RX ORDER — PREDNISONE 5 MG/1
TABLET ORAL
Qty: 30 TABLET | Refills: 0 | Status: SHIPPED | OUTPATIENT
Start: 2024-06-18 | End: 2024-06-18 | Stop reason: DRUGHIGH

## 2024-06-18 RX ORDER — PREDNISONE 5 MG/1
TABLET ORAL
Qty: 30 TABLET | Refills: 0 | Status: SHIPPED | OUTPATIENT
Start: 2024-06-18

## 2024-06-18 ASSESSMENT — PAIN SCALES - GENERAL: PAINLEVEL: 8

## 2024-06-18 NOTE — PROGRESS NOTES
Subjective   Patient ID: Megan Wolf is a 63 y.o. female who presents for Foot Pain (Left).    HPI   The patient was seen in the ER 1 day ago for a sharp shooting pain in her left foot which starts randomly. She had x-rays done which showed arthritis. She was started on Naproxen and has been taking it.     Review of Systems  Constitutional: No fever or chills  Cardiovascular: no chest pain, no palpitations and no syncope.   Respiratory: no cough, no shortness of breath during exertion and no shortness of breath at rest.   Gastrointestinal: no abdominal pain, no nausea and no vomiting.  Neuro: No Headache, no dizziness  MSK: + left foot pain    Objective   /81   Pulse 73   SpO2 98%     Physical Exam  Constitutional: Alert and in no acute distress. Well developed, well nourished  Head and Face: Head and face: Normal.    Cardiovascular: Heart rate and rhythm were normal, normal S1 and S2. No peripheral edema.   Pulmonary: No respiratory distress. Clear bilateral breath sounds.  Musculoskeletal: Gait and station: Normal. Muscle strength/tone: Normal.   Skin: Normal skin color and pigmentation, normal skin turgor, and no rash.    Psychiatric: Judgment and insight: Intact. Mood and affect: Normal.    Lab Results   Component Value Date    WBC 3.4 (L) 05/03/2021    HGB 12.8 05/03/2021    HCT 35.8 (L) 05/03/2021     05/03/2021    CHOL 200 (H) 01/28/2020    TRIG 122 01/28/2020    HDL 98.4 01/28/2020    ALT 18 01/28/2020    AST 27 01/28/2020     (L) 05/10/2021    K 4.7 05/10/2021    CL 97 (L) 05/10/2021    CREATININE 0.71 05/10/2021    BUN 19 05/10/2021    CO2 31 05/10/2021    TSH 1.62 01/28/2020       XR foot left 3+ views, XR ankle left 3+ views  Narrative: Interpreted By:  Rey Ramos,   STUDY:  XR FOOT LEFT 3+ VIEWS; XR ANKLE LEFT 3+ VIEWS; ;  6/17/2024 12:43 pm      INDICATION:  Signs/Symptoms:atraumatic foot pain.      COMPARISON:  None.      ACCESSION NUMBER(S):  [id number];  [id number]      ORDERING CLINICIAN:  LEANDRO FRANCISCO      FINDINGS:  Left ankle and left foot, three views of each hip      No acute fracture seen. No dislocation.      There is moderate joint space narrowing with osteophyte formation the  1st MTP joint. A small plantar calcaneal spurs present. There is a  small Achilles enthesophyte. No soft tissue abnormality seen      Impression: Moderate 1st MTP osteoarthritis. Small calcaneal enthesophytes.  No acute abnormality          MACRO:  None      Signed by: Rey Ramos 6/17/2024 12:56 PM  Dictation workstation:   [id number]      Assessment/Plan   Diagnoses and all orders for this visit:  Left foot pain  -     C-Reactive Protein; Future  -     Sedimentation Rate; Future  -     Uric Acid; Future  -     CBC and Auto Differential; Future  -     Renal Function Panel; Future  -     predniSONE (Deltasone) 5 mg tablet; Take 5 tabs (25 mg) by mouth daily for 2 days, then 4 tabs (20 mg) daily for 2 days, then 3 tabs (15 mg) daily for 2 days, then 2 tabs (10 mg) daily for 2 days, then 1 tab (5 mg) daily for 2 days.        Dear Megan Wolf     It was my pleasure to take care of you today in the office. Below are the things we discussed today:    1. Left foot pain: Blood work ordered. Start Prednisone.    Your yearly Physical is due in: Sept 2024   When you call the office for your yearly Physical, please ask them to inform me to order your blood work, so that you can get the fasting blood work before your appointment and we can discuss the results at your physical.      Please call me if any questions arise from now until your next visit. I will call you after I am done seeing patients. A Doctor is always available by phone when the office is closed. Please feel free to call for help with any problem that you feel shouldn't wait until the office re-opens.     Scribe Attestation  By signing my name below, IChacha Scribe   attest that this documentation has  been prepared under the direction and in the presence of Ale Callejas MD.

## 2024-06-18 NOTE — LETTER
June 18, 2024     Patient: Megan Wolf   YOB: 1960   Date of Visit: 6/18/2024       To Whom It May Concern:    It is my medical opinion that Megan Wolf may return to work on 06/24/2024 .    If you have any questions or concerns, please don't hesitate to call.         Sincerely,        Ale Callejas MD    CC: No Recipients

## 2024-06-19 ENCOUNTER — PHARMACY VISIT (OUTPATIENT)
Dept: PHARMACY | Facility: CLINIC | Age: 64
End: 2024-06-19
Payer: COMMERCIAL

## 2024-06-19 DIAGNOSIS — E53.8 VITAMIN B12 DEFICIENCY: ICD-10-CM

## 2024-06-19 DIAGNOSIS — Z12.31 VISIT FOR SCREENING MAMMOGRAM: Primary | ICD-10-CM

## 2024-06-19 DIAGNOSIS — E55.9 VITAMIN D DEFICIENCY: ICD-10-CM

## 2024-06-19 DIAGNOSIS — I10 BENIGN ESSENTIAL HTN: ICD-10-CM

## 2024-06-19 DIAGNOSIS — E87.6 HYPOKALEMIA: ICD-10-CM

## 2024-06-19 LAB
25(OH)D3 SERPL-MCNC: 9 NG/ML (ref 30–100)
ALBUMIN SERPL BCP-MCNC: 4.5 G/DL (ref 3.4–5)
ALP SERPL-CCNC: 58 U/L (ref 33–136)
ALT SERPL W P-5'-P-CCNC: 17 U/L (ref 7–45)
ANION GAP SERPL CALC-SCNC: 14 MMOL/L (ref 10–20)
AST SERPL W P-5'-P-CCNC: 38 U/L (ref 9–39)
BASOPHILS # BLD AUTO: 0.02 X10*3/UL (ref 0–0.1)
BASOPHILS NFR BLD AUTO: 0.5 %
BILIRUB SERPL-MCNC: 0.5 MG/DL (ref 0–1.2)
BUN SERPL-MCNC: 17 MG/DL (ref 6–23)
CALCIUM SERPL-MCNC: 10 MG/DL (ref 8.6–10.6)
CHLORIDE SERPL-SCNC: 100 MMOL/L (ref 98–107)
CHOLEST SERPL-MCNC: 190 MG/DL (ref 0–199)
CHOLESTEROL/HDL RATIO: 2.2
CO2 SERPL-SCNC: 29 MMOL/L (ref 21–32)
CREAT SERPL-MCNC: 0.64 MG/DL (ref 0.5–1.05)
CRP SERPL-MCNC: 2.41 MG/DL
EGFRCR SERPLBLD CKD-EPI 2021: >90 ML/MIN/1.73M*2
EOSINOPHIL # BLD AUTO: 0.1 X10*3/UL (ref 0–0.7)
EOSINOPHIL NFR BLD AUTO: 2.4 %
ERYTHROCYTE [DISTWIDTH] IN BLOOD BY AUTOMATED COUNT: 11.9 % (ref 11.5–14.5)
ERYTHROCYTE [SEDIMENTATION RATE] IN BLOOD BY WESTERGREN METHOD: 16 MM/H (ref 0–30)
EST. AVERAGE GLUCOSE BLD GHB EST-MCNC: 88 MG/DL
GLUCOSE SERPL-MCNC: 102 MG/DL (ref 74–99)
HBA1C MFR BLD: 4.7 %
HCT VFR BLD AUTO: 40.3 % (ref 36–46)
HDLC SERPL-MCNC: 85 MG/DL
HGB BLD-MCNC: 13.8 G/DL (ref 12–16)
IMM GRANULOCYTES # BLD AUTO: 0 X10*3/UL (ref 0–0.7)
IMM GRANULOCYTES NFR BLD AUTO: 0 % (ref 0–0.9)
LDLC SERPL CALC-MCNC: 86 MG/DL
LYMPHOCYTES # BLD AUTO: 1.59 X10*3/UL (ref 1.2–4.8)
LYMPHOCYTES NFR BLD AUTO: 38 %
MCH RBC QN AUTO: 33.3 PG (ref 26–34)
MCHC RBC AUTO-ENTMCNC: 34.2 G/DL (ref 32–36)
MCV RBC AUTO: 97 FL (ref 80–100)
MONOCYTES # BLD AUTO: 0.31 X10*3/UL (ref 0.1–1)
MONOCYTES NFR BLD AUTO: 7.4 %
NEUTROPHILS # BLD AUTO: 2.16 X10*3/UL (ref 1.2–7.7)
NEUTROPHILS NFR BLD AUTO: 51.7 %
NON HDL CHOLESTEROL: 105 MG/DL (ref 0–149)
NRBC BLD-RTO: 0 /100 WBCS (ref 0–0)
PLATELET # BLD AUTO: 232 X10*3/UL (ref 150–450)
POTASSIUM SERPL-SCNC: 3.1 MMOL/L (ref 3.5–5.3)
PROT SERPL-MCNC: 7.6 G/DL (ref 6.4–8.2)
RBC # BLD AUTO: 4.15 X10*6/UL (ref 4–5.2)
SODIUM SERPL-SCNC: 140 MMOL/L (ref 136–145)
TRIGL SERPL-MCNC: 93 MG/DL (ref 0–149)
TSH SERPL-ACNC: 1.83 MIU/L (ref 0.44–3.98)
URATE SERPL-MCNC: 3 MG/DL (ref 2.3–6.7)
VIT B12 SERPL-MCNC: 183 PG/ML (ref 211–911)
VLDL: 19 MG/DL (ref 0–40)
WBC # BLD AUTO: 4.2 X10*3/UL (ref 4.4–11.3)

## 2024-06-19 PROCEDURE — RXMED WILLOW AMBULATORY MEDICATION CHARGE

## 2024-06-19 RX ORDER — CYANOCOBALAMIN 1000 UG/ML
1000 INJECTION, SOLUTION INTRAMUSCULAR; SUBCUTANEOUS
Status: SHIPPED | OUTPATIENT
Start: 2024-06-20 | End: 2025-06-15

## 2024-06-19 RX ORDER — ERGOCALCIFEROL 1.25 MG/1
50000 CAPSULE ORAL
Qty: 12 CAPSULE | Refills: 0 | Status: SHIPPED | OUTPATIENT
Start: 2024-06-23 | End: 2024-09-15

## 2024-06-19 RX ORDER — LISINOPRIL AND HYDROCHLOROTHIAZIDE 20; 25 MG/1; MG/1
1 TABLET ORAL DAILY
Qty: 90 TABLET | Refills: 0 | Status: SHIPPED | OUTPATIENT
Start: 2024-06-19

## 2024-06-19 RX ORDER — POTASSIUM CHLORIDE 20 MEQ/1
20 TABLET, EXTENDED RELEASE ORAL DAILY
Qty: 90 TABLET | Refills: 0 | Status: SHIPPED | OUTPATIENT
Start: 2024-06-19

## 2024-06-19 NOTE — PROGRESS NOTES
Subjective   Patient ID: Megan Wolf is a 63 y.o. female who presents for No chief complaint on file..    HPI     Review of Systems  Constitutional: No fever or chills  Cardiovascular: no chest pain, no palpitations and no syncope.   Respiratory: no cough, no shortness of breath during exertion and no shortness of breath at rest.   Gastrointestinal: no abdominal pain, no nausea and no vomiting.  Neuro: No Headache, no dizziness    Objective   There were no vitals taken for this visit.    Physical Exam  Constitutional: Alert and in no acute distress. Well developed, well nourished  Head and Face: Head and face: Normal.    Cardiovascular: Heart rate and rhythm were normal, normal S1 and S2. No peripheral edema.   Pulmonary: No respiratory distress. Clear bilateral breath sounds.  Musculoskeletal: Gait and station: Normal. Muscle strength/tone: Normal.   Skin: Normal skin color and pigmentation, normal skin turgor, and no rash.    Psychiatric: Judgment and insight: Intact. Mood and affect: Normal.    Lab Results   Component Value Date    WBC 4.2 (L) 06/18/2024    HGB 13.8 06/18/2024    HCT 40.3 06/18/2024     06/18/2024    CHOL 190 06/18/2024    TRIG 93 06/18/2024    HDL 85.0 06/18/2024    ALT 17 06/18/2024    AST 38 06/18/2024     06/18/2024    K 3.1 (L) 06/18/2024     06/18/2024    CREATININE 0.64 06/18/2024    BUN 17 06/18/2024    CO2 29 06/18/2024    TSH 1.83 06/18/2024    HGBA1C 4.7 06/18/2024       XR foot left 3+ views, XR ankle left 3+ views  Narrative: Interpreted By:  Rey Ramos,   STUDY:  XR FOOT LEFT 3+ VIEWS; XR ANKLE LEFT 3+ VIEWS; ;  6/17/2024 12:43 pm      INDICATION:  Signs/Symptoms:atraumatic foot pain.      COMPARISON:  None.      ACCESSION NUMBER(S):  [id number]; [id number]      ORDERING CLINICIAN:  LEANDRO FRANCISCO      FINDINGS:  Left ankle and left foot, three views of each hip      No acute fracture seen. No dislocation.      There is moderate joint space  narrowing with osteophyte formation the  1st MTP joint. A small plantar calcaneal spurs present. There is a  small Achilles enthesophyte. No soft tissue abnormality seen      Impression: Moderate 1st MTP osteoarthritis. Small calcaneal enthesophytes.  No acute abnormality          MACRO:  None      Signed by: Rey Ramos 6/17/2024 12:56 PM  Dictation workstation:   [id number]      Assessment/Plan         Dear Megan Wolf     It was my pleasure to take care of you today in the office. Below are the things we discussed today:    1.       Follow up in    Your yearly Physical is due in:   When you call the office for your yearly Physical, please ask them to inform me to order your blood work, so that you can get the fasting blood work before your appointment and we can discuss the results at your physical.      Please call me if any questions arise from now until your next visit. I will call you after I am done seeing patients. A Doctor is always available by phone when the office is closed. Please feel free to call for help with any problem that you feel shouldn't wait until the office re-opens.     Ale Callejas MD

## 2024-06-20 LAB — LPA SERPL-MCNC: 21 MG/DL

## 2024-06-21 ENCOUNTER — OFFICE VISIT (OUTPATIENT)
Dept: ORTHOPEDIC SURGERY | Facility: CLINIC | Age: 64
End: 2024-06-21
Payer: COMMERCIAL

## 2024-06-21 ENCOUNTER — HOSPITAL ENCOUNTER (OUTPATIENT)
Dept: RADIOLOGY | Facility: CLINIC | Age: 64
Discharge: HOME | End: 2024-06-21
Payer: COMMERCIAL

## 2024-06-21 DIAGNOSIS — M25.562 LEFT KNEE PAIN, UNSPECIFIED CHRONICITY: ICD-10-CM

## 2024-06-21 DIAGNOSIS — M17.12 PRIMARY OSTEOARTHRITIS OF LEFT KNEE: Primary | ICD-10-CM

## 2024-06-21 DIAGNOSIS — M17.11 PRIMARY OSTEOARTHRITIS OF RIGHT KNEE: ICD-10-CM

## 2024-06-21 PROCEDURE — 99214 OFFICE O/P EST MOD 30 MIN: CPT | Mod: 25 | Performed by: ORTHOPAEDIC SURGERY

## 2024-06-21 PROCEDURE — 2500000004 HC RX 250 GENERAL PHARMACY W/ HCPCS (ALT 636 FOR OP/ED): Performed by: ORTHOPAEDIC SURGERY

## 2024-06-21 PROCEDURE — 20610 DRAIN/INJ JOINT/BURSA W/O US: CPT | Performed by: ORTHOPAEDIC SURGERY

## 2024-06-21 PROCEDURE — 2500000005 HC RX 250 GENERAL PHARMACY W/O HCPCS: Performed by: ORTHOPAEDIC SURGERY

## 2024-06-21 PROCEDURE — 99214 OFFICE O/P EST MOD 30 MIN: CPT | Performed by: ORTHOPAEDIC SURGERY

## 2024-06-21 PROCEDURE — 73562 X-RAY EXAM OF KNEE 3: CPT | Mod: LT

## 2024-06-21 PROCEDURE — 73562 X-RAY EXAM OF KNEE 3: CPT | Mod: LEFT SIDE | Performed by: RADIOLOGY

## 2024-06-21 RX ORDER — LIDOCAINE HYDROCHLORIDE 10 MG/ML
2 INJECTION INFILTRATION; PERINEURAL
Status: COMPLETED | OUTPATIENT
Start: 2024-06-21 | End: 2024-06-21

## 2024-06-21 RX ORDER — TRIAMCINOLONE ACETONIDE 40 MG/ML
1 INJECTION, SUSPENSION INTRA-ARTICULAR; INTRAMUSCULAR
Status: COMPLETED | OUTPATIENT
Start: 2024-06-21 | End: 2024-06-21

## 2024-06-21 NOTE — PROGRESS NOTES
Patient ID: Megan Wolf is a 63 y.o. female.    L Inj/Asp: L knee on 6/21/2024 9:16 AM  Indications: pain  Details: 20 G needle, anterior approach  Medications: 1 mL triamcinolone acetonide 40 mg/mL; 2 mL lidocaine 10 mg/mL (1 %)  Outcome: tolerated well, no immediate complications  Procedure, treatment alternatives, risks and benefits explained, specific risks discussed. Consent was given by the patient.

## 2024-06-21 NOTE — LETTER
June 21, 2024     Ale Callejas MD  [address]  [address]  [facility] [address]    Patient: Megan Wolf   YOB: 1960   Date of Visit: 6/21/2024       Dear Dr. Ale Callejas MD:    Thank you for referring Megan Wolf to me for evaluation. Below are my notes for this consultation.  If you have questions, please do not hesitate to call me. I look forward to following your patient along with you.       Sincerely,     Ian Montero MD      CC: No Recipients  ______________________________________________________________________________________    Patient ID: Megan Wolf is a 63 y.o. female.    L Inj/Asp: L knee on 6/21/2024 9:16 AM  Indications: pain  Details: 20 G needle, anterior approach  Medications: 1 mL triamcinolone acetonide 40 mg/mL; 2 mL lidocaine 10 mg/mL (1 %)  Outcome: tolerated well, no immediate complications  Procedure, treatment alternatives, risks and benefits explained, specific risks discussed. Consent was given by the patient.           This 63-year-old woman is here with her mother complaining of left knee pain.  Patient notes a week ago her left knee became very painful.  She denies any injury.  She notes the pain was lateral.  She denies any previous problems with the left knee.  The patient notes swelling in the knee.  She denies giving way or locking but notes stiffness.  Patient denies any crepitus in the knee.  She denies any neurologic symptoms in the lower extremity.    Complicating the patient's situation is her past medical history which includes hypertensive heart disease with heart failure and anemia.    Review of systems:  A complete review of the patient's past medical history and review of 30 systems and all medications and allergies was performed. Please see adult medical record for details.    A Family history for genetic or familial inheritance issues and Social history including smoking history, alcohol consumption and exercise  activities was also reviewed and significant findings noted in the patients history of present illness.    Physical Exam:  The patient is well-nourished and well-developed and in no acute distress. The patient displayed normal mood and affect. The patient's pupils were equal, round sclerae are white. Patient's respirations appear to be regular and unlabored.     Physical examination of the left knee revealed no effusion in the knee and there were no skin abnormalities or lymphangitis. The knee demonstrated Valgus alignment. There was no tenderness about the knee, thigh or calf. There was tenderness at the lateral joint space. There was no discomfort with patellofemoral compression. The knee came to within 25 degrees of full extension. Straight leg raising was without lag, flexion was to 90 degrees and ligamentous stability was full. The neurovascular examination extremity was intact.The neurological exam including motor and sensory exam was performed. The vascular examination including palpation of pulses and capillary refill of the foot was performed and determined to be intact.    Imaging:  I personally reviewed and measured the radiographs including AP and lateral views of the extremity and they revealed tricompartmental osteoarthritis of the knee with narrowing of the medial joint space.    Impression & Plan:   It is my impression that this patient has osteoarthritis of her left knee and marked loss of motion.  Unsure as to how long the loss of motion has been going on as I have not examined her left knee in the past.  The patient is already scheduled for a right total knee replacement July 24, 2024.    After adequate informed consent I injected the left knee with 40 mg of Kenalog and local anesthesia.  If the patient does not note marked relief of her pain within a week she should contact my office.  I have given her an off work slip for 1 week.      Note dictated with [employer] software.   Completed without full type editing and sent to avoid delay.

## 2024-06-21 NOTE — PROGRESS NOTES
This 63-year-old woman is here with her mother complaining of left knee pain.  Patient notes a week ago her left knee became very painful.  She denies any injury.  She notes the pain was lateral.  She denies any previous problems with the left knee.  The patient notes swelling in the knee.  She denies giving way or locking but notes stiffness.  Patient denies any crepitus in the knee.  She denies any neurologic symptoms in the lower extremity.    Complicating the patient's situation is her past medical history which includes hypertensive heart disease with heart failure and anemia.    Review of systems:  A complete review of the patient's past medical history and review of 30 systems and all medications and allergies was performed. Please see adult medical record for details.    A Family history for genetic or familial inheritance issues and Social history including smoking history, alcohol consumption and exercise activities was also reviewed and significant findings noted in the patients history of present illness.    Physical Exam:  The patient is well-nourished and well-developed and in no acute distress. The patient displayed normal mood and affect. The patient's pupils were equal, round sclerae are white. Patient's respirations appear to be regular and unlabored.     Physical examination of the left knee revealed no effusion in the knee and there were no skin abnormalities or lymphangitis. The knee demonstrated Valgus alignment. There was no tenderness about the knee, thigh or calf. There was tenderness at the lateral joint space. There was no discomfort with patellofemoral compression. The knee came to within 25 degrees of full extension. Straight leg raising was without lag, flexion was to 90 degrees and ligamentous stability was full. The neurovascular examination extremity was intact.The neurological exam including motor and sensory exam was performed. The vascular examination including palpation of pulses  and capillary refill of the foot was performed and determined to be intact.    Imaging:  I personally reviewed and measured the radiographs including AP and lateral views of the extremity and they revealed tricompartmental osteoarthritis of the knee with narrowing of the medial joint space.    Impression & Plan:   It is my impression that this patient has osteoarthritis of her left knee and marked loss of motion.  Unsure as to how long the loss of motion has been going on as I have not examined her left knee in the past.  The patient is already scheduled for a right total knee replacement July 24, 2024.    After adequate informed consent I injected the left knee with 40 mg of Kenalog and local anesthesia.  If the patient does not note marked relief of her pain within a week she should contact my office.  I have given her an off work slip for 1 week.      Note dictated with [employer] software.  Completed without full type editing and sent to avoid delay.

## 2024-06-24 ENCOUNTER — APPOINTMENT (OUTPATIENT)
Dept: PRIMARY CARE | Facility: CLINIC | Age: 64
End: 2024-06-24
Payer: COMMERCIAL

## 2024-06-24 ENCOUNTER — LAB (OUTPATIENT)
Dept: LAB | Facility: LAB | Age: 64
End: 2024-06-24
Payer: COMMERCIAL

## 2024-06-24 VITALS
BODY MASS INDEX: 27.8 KG/M2 | OXYGEN SATURATION: 98 % | SYSTOLIC BLOOD PRESSURE: 120 MMHG | DIASTOLIC BLOOD PRESSURE: 79 MMHG | WEIGHT: 173 LBS | HEART RATE: 80 BPM | HEIGHT: 66 IN

## 2024-06-24 DIAGNOSIS — I10 BENIGN ESSENTIAL HTN: ICD-10-CM

## 2024-06-24 DIAGNOSIS — M79.672 LEFT FOOT PAIN: ICD-10-CM

## 2024-06-24 PROBLEM — I11.0 HYPERTENSIVE HEART DISEASE WITH HEART FAILURE (MULTI): Status: RESOLVED | Noted: 2023-04-17 | Resolved: 2024-06-24

## 2024-06-24 PROBLEM — M25.562 LEFT KNEE PAIN: Status: RESOLVED | Noted: 2024-06-21 | Resolved: 2024-06-24

## 2024-06-24 LAB
ALBUMIN SERPL BCP-MCNC: 5.1 G/DL (ref 3.4–5)
ALP SERPL-CCNC: 55 U/L (ref 33–136)
ALT SERPL W P-5'-P-CCNC: 20 U/L (ref 7–45)
ANION GAP SERPL CALC-SCNC: 16 MMOL/L (ref 10–20)
AST SERPL W P-5'-P-CCNC: 22 U/L (ref 9–39)
BILIRUB SERPL-MCNC: 0.6 MG/DL (ref 0–1.2)
BUN SERPL-MCNC: 17 MG/DL (ref 6–23)
CALCIUM SERPL-MCNC: 10.9 MG/DL (ref 8.6–10.6)
CHLORIDE SERPL-SCNC: 93 MMOL/L (ref 98–107)
CO2 SERPL-SCNC: 32 MMOL/L (ref 21–32)
CREAT SERPL-MCNC: 0.68 MG/DL (ref 0.5–1.05)
CREAT UR-MCNC: 37.8 MG/DL (ref 20–320)
EGFRCR SERPLBLD CKD-EPI 2021: >90 ML/MIN/1.73M*2
ERYTHROCYTE [DISTWIDTH] IN BLOOD BY AUTOMATED COUNT: 11.2 % (ref 11.5–14.5)
GLUCOSE SERPL-MCNC: 114 MG/DL (ref 74–99)
HCT VFR BLD AUTO: 43.6 % (ref 36–46)
HGB BLD-MCNC: 15.3 G/DL (ref 12–16)
MCH RBC QN AUTO: 32.6 PG (ref 26–34)
MCHC RBC AUTO-ENTMCNC: 35.1 G/DL (ref 32–36)
MCV RBC AUTO: 93 FL (ref 80–100)
MICROALBUMIN UR-MCNC: <7 MG/L
MICROALBUMIN/CREAT UR: NORMAL MG/G{CREAT}
NRBC BLD-RTO: 0 /100 WBCS (ref 0–0)
PHOSPHATE SERPL-MCNC: 4.1 MG/DL (ref 2.5–4.9)
PLATELET # BLD AUTO: 335 X10*3/UL (ref 150–450)
POTASSIUM SERPL-SCNC: 3.6 MMOL/L (ref 3.5–5.3)
PROT SERPL-MCNC: 8.3 G/DL (ref 6.4–8.2)
RBC # BLD AUTO: 4.7 X10*6/UL (ref 4–5.2)
SODIUM SERPL-SCNC: 137 MMOL/L (ref 136–145)
WBC # BLD AUTO: 4.4 X10*3/UL (ref 4.4–11.3)

## 2024-06-24 PROCEDURE — 82570 ASSAY OF URINE CREATININE: CPT

## 2024-06-24 PROCEDURE — 3078F DIAST BP <80 MM HG: CPT | Performed by: FAMILY MEDICINE

## 2024-06-24 PROCEDURE — 84100 ASSAY OF PHOSPHORUS: CPT

## 2024-06-24 PROCEDURE — 36415 COLL VENOUS BLD VENIPUNCTURE: CPT

## 2024-06-24 PROCEDURE — 3074F SYST BP LT 130 MM HG: CPT | Performed by: FAMILY MEDICINE

## 2024-06-24 PROCEDURE — 82043 UR ALBUMIN QUANTITATIVE: CPT

## 2024-06-24 PROCEDURE — 85027 COMPLETE CBC AUTOMATED: CPT

## 2024-06-24 PROCEDURE — 99214 OFFICE O/P EST MOD 30 MIN: CPT | Performed by: FAMILY MEDICINE

## 2024-06-24 PROCEDURE — 83880 ASSAY OF NATRIURETIC PEPTIDE: CPT

## 2024-06-24 PROCEDURE — 80053 COMPREHEN METABOLIC PANEL: CPT

## 2024-06-24 PROCEDURE — 1036F TOBACCO NON-USER: CPT | Performed by: FAMILY MEDICINE

## 2024-06-24 NOTE — PROGRESS NOTES
"Subjective   Patient ID: Megan Wolf is a 63 y.o. female who presents for Follow-up and Leg Swelling.    HPI   The patient reports that she followed up with Orthopedic surgery for her left knee pain and swelling. She had an x-ray done and was diagnosed with arthritis but she is concerned that it may be associated with CHF. She was given an injection by Ortho. She is currently taking a course of Prednisone and states that the swelling has improved since starting it. She denies having any pain or swelling of her right leg/ knee. Her blood pressure is stable on the current dose of lisinopril- hydrochlorothiazide.    Review of Systems  Constitutional: No fever or chills  Cardiovascular: no chest pain, no palpitations and no syncope.   Respiratory: no cough, no shortness of breath during exertion and no shortness of breath at rest.   Gastrointestinal: no abdominal pain, no nausea and no vomiting.  Neuro: No Headache, no dizziness  MSK: + left knee pain and swelling.    Objective   /79   Pulse 80   Ht 1.676 m (5' 6\")   Wt 78.5 kg (173 lb)   SpO2 98%   BMI 27.92 kg/m²     Physical Exam  Constitutional: Alert and in no acute distress. Well developed, well nourished  Head and Face: Head and face: Normal.    Cardiovascular: Heart rate and rhythm were normal, normal S1 and S2. No peripheral edema.   Pulmonary: No respiratory distress. Clear bilateral breath sounds.  Musculoskeletal: Gait and station: Normal. Muscle strength/tone: Normal.   Skin: Normal skin color and pigmentation, normal skin turgor, and no rash.    Psychiatric: Judgment and insight: Intact. Mood and affect: Normal.    Lab Results   Component Value Date    WBC 4.2 (L) 06/18/2024    HGB 13.8 06/18/2024    HCT 40.3 06/18/2024     06/18/2024    CHOL 190 06/18/2024    TRIG 93 06/18/2024    HDL 85.0 06/18/2024    ALT 17 06/18/2024    AST 38 06/18/2024     06/18/2024    K 3.1 (L) 06/18/2024     06/18/2024    CREATININE 0.64 " 06/18/2024    BUN 17 06/18/2024    CO2 29 06/18/2024    TSH 1.83 06/18/2024    HGBA1C 4.7 06/18/2024       XR knee left 3 views  Narrative: Interpreted By:  Jake Mazariegos,   STUDY:  XR KNEE LEFT 3 VIEWS      INDICATION:  Signs/Symptoms:pain.      COMPARISON:  None      ACCESSION NUMBER(S):  [id number]      ORDERING CLINICIAN:  BK BUCHANAN      FINDINGS:  Moderate osteoarthritis left knee worst patellofemoral. No fracture  or lesion seen.      Impression: Moderate osteoarthritis left knee worst patellofemoral.      Signed by: Jake Mazariegos 6/22/2024 9:25 AM  Dictation workstation:   [id number]      Assessment/Plan   Diagnoses and all orders for this visit:  Benign essential HTN  -     Follow Up In [facility] - PCP - Health Maintenance  -     B-Type Natriuretic Peptide; Future  -     Comprehensive Metabolic Panel; Future        Dear Megan Wolf     It was my pleasure to take care of you today in the office. Below are the things we discussed today:    1. Vitamin B-12 deficiency: Vitamin B-12 injection given to the patient today and thereafter she will follow up monthly for it.    2. Hypertension: Well- controlled. Continue lisinopril- hydrochlorothiazide.    3.  Left knee swelling: The patient will finish the course of Prednisone.    4. Blood work: Ordered.    Your yearly Physical is due in: Sept 2024   When you call the office for your yearly Physical, please ask them to inform me to order your blood work, so that you can get the fasting blood work before your appointment and we can discuss the results at your physical.      Please call me if any questions arise from now until your next visit. I will call you after I am done seeing patients. A Doctor is always available by phone when the office is closed. Please feel free to call for help with any problem that you feel shouldn't wait until the office re-opens.     Scribe Attestation  By signing my name below, I, Steve Holman  that this documentation has been prepared under the direction and in the presence of Ale Callejas MD.

## 2024-06-25 LAB — BNP SERPL-MCNC: 12 PG/ML (ref 0–99)

## 2024-07-03 ENCOUNTER — CLINICAL SUPPORT (OUTPATIENT)
Dept: PREADMISSION TESTING | Facility: HOSPITAL | Age: 64
End: 2024-07-03
Payer: COMMERCIAL

## 2024-07-03 NOTE — CPM/PAT NURSE NOTE
CPM/PAT Nurse Note      Name: Megan Wolf (Megan Wolf)  /Age: 1960/63 y.o.       Past Medical History:   Diagnosis Date    Arthritis     osteoarthritis of her left knee    Body mass index (BMI)30.0-30.9, adult 2021    Body mass index (BMI) of 30.0 to 30.9 in adult    CHF (congestive heart failure) (Multi) 2021    Hypertension     Obesity, unspecified 2021    Obesity (BMI 30.0-34.9)    Vitamin B12 deficiency        Past Surgical History:   Procedure Laterality Date    COLONOSCOPY         Patient  reports being sexually active and has had partner(s) who are male. She reports using the following method of birth control/protection: Post-menopausal.    Family History   Problem Relation Name Age of Onset    No Known Problems Mother      No Known Problems Father         No Known Allergies    Prior to Admission medications    Medication Sig Start Date End Date Taking? Authorizing Provider   ergocalciferol (Vitamin D-2) 1.25 MG (25465 UT) capsule Take 1 capsule (50,000 Units) by mouth 1 (one) time per week. 6/23/24 9/15/24  Ale Callejas MD   lisinopriL-hydrochlorothiazide 20-25 mg tablet Take 1 tablet by mouth once daily. 24   Ale Callejas MD   naproxen (Naprosyn) 500 mg tablet Take 1 tablet (500 mg) by mouth 2 times a day as needed for mild pain (1 - 3). 24   Mavis Bond PA-C   potassium chloride CR 20 mEq ER tablet Take 1 tablet (20 mEq) by mouth once daily. Do not crush or chew. 24   Ale Callejas MD   predniSONE (Deltasone) 5 mg tablet Take 5 tabs (25 mg) by mouth daily for 2 days, then 4 tabs (20 mg) daily for 2 days, then 3 tabs (15 mg) daily for 2 days, then 2 tabs (10 mg) daily for 2 days, then 1 tab (5 mg) daily for 2 days. 24   Ale Callejas MD        PAT ROS     DASI Risk Score    No data to display       Caprini DVT Assessment    No data to display       Modified Frailty Index    No data to display       CHADS2 Stroke Risk  Current as of   hours ago        N/A 3 to 100%: High Risk   2 to < 3%: Medium Risk   0 to < 2%: Low Risk     Last Change: N/A          This score determines the patient's risk of having a stroke if the patient has atrial fibrillation.        This score is not applicable to this patient. Components are not calculated.          Revised Cardiac Risk Index    No data to display       Apfel Simplified Score    No data to display       Risk Analysis Index Results This Encounter    No data found in the last 1 encounters.     Scheduled for knee replacement total cement unilat-right with Dr. Montero on 24.  PCP: Ale Callejas MD LOV 24  Orthopaedic Surgery: Ian Montero MD LOV 24 seen for left knee pain.   -Transthoracic ECHO: 21  CONCLUSIONS:   1. The left ventricular systolic function is normal with a 65% estimated ejection fraction.   2. The left atrium is mild to moderately dilated.   3. Mildly elevated RVSP.     -EC23  Normal sinus rhythm possible left atrial enlargement nonspecific  t wave abnormality abnormal ECG    Nurse Plan of Action:   Unable to reach the patient to review health history. LVM.  ONLY    Mavis Phillips RN  Pre-Admission Testing

## 2024-07-05 ENCOUNTER — APPOINTMENT (OUTPATIENT)
Dept: PREADMISSION TESTING | Facility: HOSPITAL | Age: 64
End: 2024-07-05
Payer: COMMERCIAL

## 2024-07-12 ENCOUNTER — HOSPITAL ENCOUNTER (OUTPATIENT)
Dept: CARDIOLOGY | Facility: HOSPITAL | Age: 64
Discharge: HOME | End: 2024-07-12
Payer: COMMERCIAL

## 2024-07-12 ENCOUNTER — PHARMACY VISIT (OUTPATIENT)
Dept: PHARMACY | Facility: CLINIC | Age: 64
End: 2024-07-12
Payer: COMMERCIAL

## 2024-07-12 ENCOUNTER — PRE-ADMISSION TESTING (OUTPATIENT)
Dept: PREADMISSION TESTING | Facility: HOSPITAL | Age: 64
End: 2024-07-12
Payer: COMMERCIAL

## 2024-07-12 VITALS
HEIGHT: 66 IN | SYSTOLIC BLOOD PRESSURE: 98 MMHG | DIASTOLIC BLOOD PRESSURE: 68 MMHG | TEMPERATURE: 97.4 F | WEIGHT: 172 LBS | RESPIRATION RATE: 16 BRPM | HEART RATE: 100 BPM | BODY MASS INDEX: 27.64 KG/M2

## 2024-07-12 DIAGNOSIS — M17.11 UNILATERAL PRIMARY OSTEOARTHRITIS, RIGHT KNEE: Primary | ICD-10-CM

## 2024-07-12 DIAGNOSIS — M17.11 UNILATERAL PRIMARY OSTEOARTHRITIS, RIGHT KNEE: ICD-10-CM

## 2024-07-12 LAB
ABO GROUP (TYPE) IN BLOOD: NORMAL
ANION GAP SERPL CALC-SCNC: 13 MMOL/L (ref 10–20)
ANTIBODY SCREEN: NORMAL
BUN SERPL-MCNC: 21 MG/DL (ref 6–23)
CALCIUM SERPL-MCNC: 10.2 MG/DL (ref 8.6–10.6)
CHLORIDE SERPL-SCNC: 96 MMOL/L (ref 98–107)
CO2 SERPL-SCNC: 28 MMOL/L (ref 21–32)
CREAT SERPL-MCNC: 0.79 MG/DL (ref 0.5–1.05)
EGFRCR SERPLBLD CKD-EPI 2021: 85 ML/MIN/1.73M*2
ERYTHROCYTE [DISTWIDTH] IN BLOOD BY AUTOMATED COUNT: 11 % (ref 11.5–14.5)
GLUCOSE SERPL-MCNC: 95 MG/DL (ref 74–99)
HCT VFR BLD AUTO: 38.7 % (ref 36–46)
HGB BLD-MCNC: 13.6 G/DL (ref 12–16)
MCH RBC QN AUTO: 31.7 PG (ref 26–34)
MCHC RBC AUTO-ENTMCNC: 35.1 G/DL (ref 32–36)
MCV RBC AUTO: 90 FL (ref 80–100)
NRBC BLD-RTO: 0 /100 WBCS (ref 0–0)
PLATELET # BLD AUTO: 177 X10*3/UL (ref 150–450)
POTASSIUM SERPL-SCNC: 4.2 MMOL/L (ref 3.5–5.3)
RBC # BLD AUTO: 4.29 X10*6/UL (ref 4–5.2)
RH FACTOR (ANTIGEN D): NORMAL
SODIUM SERPL-SCNC: 133 MMOL/L (ref 136–145)
WBC # BLD AUTO: 3.4 X10*3/UL (ref 4.4–11.3)

## 2024-07-12 PROCEDURE — 80048 BASIC METABOLIC PNL TOTAL CA: CPT

## 2024-07-12 PROCEDURE — RXMED WILLOW AMBULATORY MEDICATION CHARGE

## 2024-07-12 PROCEDURE — 85027 COMPLETE CBC AUTOMATED: CPT

## 2024-07-12 PROCEDURE — 99205 OFFICE O/P NEW HI 60 MIN: CPT | Performed by: NURSE PRACTITIONER

## 2024-07-12 PROCEDURE — 87081 CULTURE SCREEN ONLY: CPT

## 2024-07-12 PROCEDURE — 36415 COLL VENOUS BLD VENIPUNCTURE: CPT

## 2024-07-12 PROCEDURE — 86901 BLOOD TYPING SEROLOGIC RH(D): CPT

## 2024-07-12 RX ORDER — CHLORHEXIDINE GLUCONATE ORAL RINSE 1.2 MG/ML
15 SOLUTION DENTAL AS NEEDED
Qty: 473 ML | Refills: 0 | Status: SHIPPED | OUTPATIENT
Start: 2024-07-12 | End: 2024-07-14

## 2024-07-12 ASSESSMENT — DUKE ACTIVITY SCORE INDEX (DASI)
CAN YOU HAVE SEXUAL RELATIONS: YES
TOTAL_SCORE: 58.2
DASI METS SCORE: 9.9
CAN YOU CLIMB A FLIGHT OF STAIRS OR WALK UP A HILL: YES
CAN YOU DO YARD WORK LIKE RAKING LEAVES, WEEDING OR PUSHING A MOWER: YES
CAN YOU DO MODERATE WORK AROUND THE HOUSE LIKE VACUUMING, SWEEPING FLOORS OR CARRYING GROCERIES: YES
CAN YOU DO HEAVY WORK AROUND THE HOUSE LIKE SCRUBBING FLOORS OR LIFTING AND MOVING HEAVY FURNITURE: YES
CAN YOU PARTICIPATE IN STRENOUS SPORTS LIKE SWIMMING, SINGLES TENNIS, FOOTBALL, BASKETBALL, OR SKIING: YES
CAN YOU RUN A SHORT DISTANCE: YES
CAN YOU DO LIGHT WORK AROUND THE HOUSE LIKE DUSTING OR WASHING DISHES: YES
CAN YOU TAKE CARE OF YOURSELF (EAT, DRESS, BATHE, OR USE TOILET): YES
CAN YOU WALK A BLOCK OR TWO ON LEVEL GROUND: YES
CAN YOU WALK INDOORS, SUCH AS AROUND YOUR HOUSE: YES
CAN YOU PARTICIPATE IN MODERATE RECREATIONAL ACTIVITIES LIKE GOLF, BOWLING, DANCING, DOUBLES TENNIS OR THROWING A BASEBALL OR FOOTBALL: YES

## 2024-07-12 ASSESSMENT — ENCOUNTER SYMPTOMS
ARTHRALGIAS: 1
GASTROINTESTINAL NEGATIVE: 1
RESPIRATORY NEGATIVE: 1
CONSTITUTIONAL NEGATIVE: 1
CARDIOVASCULAR NEGATIVE: 1
NECK NEGATIVE: 1
ENDOCRINE NEGATIVE: 1
NEUROLOGICAL NEGATIVE: 1

## 2024-07-12 ASSESSMENT — LIFESTYLE VARIABLES: SMOKING_STATUS: NONSMOKER

## 2024-07-12 NOTE — PREPROCEDURE INSTRUCTIONS
Fasting Guidelines    NPO Instructions:    Do not eat any food after midnight the night before your surgery/procedure.  You may have up to TEN ounces of clear liquids until TWO hours before your instructed arrival time to the hospital. This includes water, black tea/coffee, (no milk or cream), apple juice, and/or electrolyte drinks (Gatorade).  You may chew gum up to TWO hours before your surgery/procedure.    Additional Instructions:     We have sent a prescription for Hibiclens soap and Peridex mouth wash to your preferred pharmacy.  If you have not already, Please  your prescription and start using as directed before surgery.  Follow the instruction sheet provided to you at your CPM/PAT appointment.    Avoid herbal supplements, multivitamins and NSAIDS (non-steroidal anti-inflammatory drugs) such as Advil, Aleve, Ibuprofen, Naproxen, Excedrin, Meloxicam or Celebrex for at least 7 days prior to surgery. May take Tylenol as needed.    Avoid tobacco and alcohol products for 24 hours prior to surgery.    CONTACT SURGEON'S OFFICE IF YOU DEVELOP:  * Fever = 100.4 F   * New respiratory symptoms (e.g. cough, shortness of breath, respiratory distress, sore throat)  * Recent loss of taste or smell  *Flu like symptoms such as headache, fatigue or gastrointestinal symptoms  * You develop any open sores, shingles, burning or painful urination   AND/OR:  * You no longer wish to have the surgery.  * Any other personal circumstances change that may lead to the need to cancel or defer this surgery.  *You were admitted to any hospital within one week of your planned procedure.    Seven/Six Days before Surgery:  Review your medication instructions, stop indicated medications    Day of Surgery:  Review your medication instructions, take indicated medications  Wear comfortable loose fitting clothing  Do not use moisturizers, creams, lotions or perfume  All jewelry and valuables should be left at home      [facility]  [phone]           Patient Information: Pre-Operative Infection Prevention Measures     Why did I have my nose, under my arms, and groin swabbed?  The purpose of the swab is to identify Staphylococcus aureus inside your nose or on your skin.  The swab was sent to the laboratory for culture.  A positive swab/culture for Staphylococcus aureus is called colonization or carriage.      What is Staphylococcus aureus?  Staphylococcus aureus, also known as “staph”, is a germ found on the skin or in the nose of healthy people.  Sometimes Staphylococcus aureus can get into the body and cause an infection.  This can be minor (such as pimples, boils, or other skin problems).  It might also be serious (such as a blood infection, pneumonia, or a surgical site infection).    What is Staphylococcus aureus colonization or carriage?  Colonization or carriage means that a person has the germ but is not sick from it.  These bacteria can be spread on the hands or when breathing or sneezing.    How is Staphylococcus aureus spread?  It is most often spread by close contact with a person or item that carries it.    What happens if my culture is positive for Staphylococcus aureus?  Your doctor/medical team will use this information to guide any antibiotic treatment which may be necessary.  Regardless of the culture results, we will clean the inside of your nose with a betadine swab just before you have your surgery.      Will I get an infection if I have Staphylococcus aureus in my nose or on my skin?  Anyone can get an infection with Staphylococcus aureus.  However, the best way to reduce your risk of infection is to follow the instructions provided to you for the use of your CHG soap and dental rinse.        Patient Information: Oral/Dental Rinse    What is oral/dental rinse?   It is a mouthwash. It is a way of cleaning the mouth with a germ-killing solution before your surgery.  The solution contains  chlorhexidine, commonly known as CHG.   It is used inside the mouth to kill a bacteria known as Staphylococcus aureus.  Let your doctor know if you are allergic to Chlorhexidine.    Why do I need to use CHG oral/dental rinse?  The CHG oral/dental rinse helps to kill a bacteria in your mouth known as Staphylococcus aureus.     This reduces the risk of infection at the surgical site.      Using your CHG oral/dental rinse  STEPS:  Use your CHG oral/dental rinse after you brush your teeth the night before (at bedtime) and the morning of your surgery.  Follow all directions on your prescription label.    Use the cap on the container to measure 15ml   Swish (gargle if you can) the mouthwash in your mouth for at least 30 seconds, (do not swallow) and spit out  After you use your CHG rinse, do not rinse your mouth with water, drink or eat.  Please refer to the prescription label for the appropriate time to resume oral intake      What side effects might I have using the CHG oral/dental rinse?  CHG rinse will stick to plaque on the teeth.  Brush and floss just before use.  Teeth brushing will help avoid staining of plaque during use.      Patient Information: Home Preoperative Antibacterial Shower      What is a home preoperative antibacterial shower?  This shower is a way of cleaning the skin with a germ-killing solution before surgery.  The solution contains chlorhexidine, commonly known as CHG.  CHG is a skin cleanser with germ-killing ability.  Let your doctor know if you are allergic to chlorhexidine.    Why do I need to take a preoperative antibacterial shower?  Skin is not sterile.  It is best to try to make your skin as free of germs as possible before surgery.  Proper cleansing with a germ-killing soap before surgery can lower the number of germs on your skin.  This helps to reduce the risk of infection at the surgical site.  Following the instructions listed below will help you prepare your skin for surgery.       How do I use the solution?  Steps:  Begin using your CHG soap 5 days before your scheduled surgery on ________________________.    First, wash and rinse your hair using the CHG soap. Keep CHG soap away from ear canals and eyes.  Rinse completely, do not condition.  Hair extensions should be removed.  Wash your face with your normal soap and rinse.    Apply the CHG solution to a clean wet washcloth.  Turn the water off or move away from the water spray to avoid premature rinsing of the CHG soap as you are applying.   Firmly lather your entire body from the neck down.  Do not use on your face.  Pay special attention to the area(s) where your incision(s) will be located unless they are on your face.  Avoid scrubbing your skin too hard.  The important point is to have the CHG soap sit on your skin for 3 minutes.    When the 3 minutes are up, turn on the water and rinse the CHG solution off your body completely.   DO NOT wash with regular soap after you have used the CHG soap solution  Pat yourself dry with a clean, freshly-laundered towel.  DO NOT apply powders, deodorants, or lotions.  Dress in clean, freshly laundered nightclothes.    Be sure to sleep with clean, freshly laundered sheets.  Be aware that CHG will cause stains on fabrics; if you wash them with bleach after use.  Rinse your washcloth and other linens that have contact with CHG completely.  Use only non-chlorine detergents to launder the items used.   The morning of surgery is the fifth day.  Repeat the above steps and dress in clean comfortable clothing     Whom should I contact if I have any questions regarding the use of CHG soap?  Call the [facility], [facility] at [phone] if you have any questions.               Preoperative Brain Exercises    What are brain exercises?  A brain exercise is any activity that engages your thinking (cognitive) skills.    What types of activities are  considered brain exercises?  Jigsaw puzzles, crossword puzzles, word jumble, memory games, word search, and many more.  Many can be found free online or on your phone via a mobile sera.    Why should I do brain exercises before my surgery?  More recent research has shown brain exercise before surgery can lower the risk of postoperative delirium (confusion) which can be especially important for older adults.  Patients who did brain exercises for 5 to 10 hours the days before surgery, cut their risk of postoperative delirium in half up to 1 week after surgery.         [facility]    Preoperative Deep Breathing Exercises    Why it is important to do deep breathing exercises before my surgery?  Deep breathing exercises strengthen your breathing muscles.  This helps you to recover after your surgery and decreases the chance of breathing complications.      How are the deep breathing exercises done?  Sit straight with your back supported.  Breathe in deeply and slowly through your nose. Your lower rib cage should expand and your abdomen may move forward.  Hold that breath for 3 to 5 seconds.  Breathe out through pursed lips, slowly and completely.  Rest and repeat 10 times every hour while awake.  Rest longer if you become dizzy or lightheaded.         Patient and Family Education             Ways You Can Help Prevent Blood Clots             This handout explains some simple things you can do to help prevent blood clots.      Blood clots are blockages that can form in the body's veins. When a blood clot forms in your deep veins, it may be called a deep vein thrombosis, or DVT for short. Blood clots can happen in any part of the body where blood flows, but they are most common in the arms and legs. If a piece of a blood clot breaks free and travels to the lungs, it is called a pulmonary embolus (PE). A PE can be a very serious problem.         Being in the hospital or having surgery can raise your  chances of getting a blood clot because you may not be well enough to move around as much as you normally do.         Ways you can help prevent blood clots in the hospital         Wearing SCDs. SCDs stands for Sequential Compression Devices.   SCDs are special sleeves that wrap around your legs  They attach to a pump that fills them with air to gently squeeze your legs every few minutes.   This helps return the blood in your legs to your heart.   SCDs should only be taken off when walking or bathing.   SCDs may not be comfortable, but they can help save your life.               Wearing compression stockings - if your doctor orders them. These special snug fitting stockings gently squeeze your legs to help blood flow.       Walking. Walking helps move the blood in your legs.   If your doctor says it is ok, try walking the halls at least   5 times a day. Ask us to help you get up, so you don't fall.      Taking any blood thinning medicines your doctor orders.        Page 1 of 2     [facility]; 3/23   Ways you can help prevent blood clots at home       Wearing compression stockings - if your doctor orders them. ? Walking - to help move the blood in your legs.       Taking any blood thinning medicines your doctor orders.      Signs of a blood clot or PE      Tell your doctor or nurse know right away if you have of the problems listed below.    If you are at home, seek medical care right away. Call 911 for chest pain or problems breathing.               Signs of a blood clot (DVT) - such as pain,  swelling, redness or warmth in your arm or leg      Signs of a pulmonary embolism (PE) - such as chest     pain or feeling short of breath

## 2024-07-12 NOTE — CPM/PAT H&P
CPM/PAT Evaluation       Name: Megan Wolf (Megan Wolf)  /Age: 1960/63 y.o.     Visit Type:   In-Person       Chief Complaint: Unilateral primary osteoarthritis, right knee    HPI  Pt is a 63 year old female with right knee osteoarthritis being evaluated in [facility] in anticipation of a Right Knee Replacement with Dr. Montero on 24.  Past Medical History:   Diagnosis Date    Arthritis     osteoarthritis of her left knee    Body mass index (BMI)30.0-30.9, adult 2021    Body mass index (BMI) of 30.0 to 30.9 in adult    Hypertension     Obesity, unspecified 2021    Obesity (BMI 30.0-34.9)    Vitamin B12 deficiency        Past Surgical History:   Procedure Laterality Date    COLONOSCOPY         Patient  reports being sexually active and has had partner(s) who are male. She reports using the following method of birth control/protection: Post-menopausal.    Family History   Problem Relation Name Age of Onset    No Known Problems Mother      No Known Problems Father         No Known Allergies    Prior to Admission medications    Medication Sig Start Date End Date Taking? Authorizing Provider   ergocalciferol (Vitamin D-2) 1.25 MG (11193 UT) capsule Take 1 capsule (50,000 Units) by mouth 1 (one) time per week. 6/23/24 9/15/24 Yes Ale Callejas MD   lisinopriL-hydrochlorothiazide 20-25 mg tablet Take 1 tablet by mouth once daily. 24  Yes Ale Callejas MD   naproxen (Naprosyn) 500 mg tablet Take 1 tablet (500 mg) by mouth 2 times a day as needed for mild pain (1 - 3). 24  Yes Mavis Bond PA-C   potassium chloride CR 20 mEq ER tablet Take 1 tablet (20 mEq) by mouth once daily. Do not crush or chew. 24  Yes Ale Callejas MD   predniSONE (Deltasone) 5 mg tablet Take 5 tabs (25 mg) by mouth daily for 2 days, then 4 tabs (20 mg) daily for 2 days, then 3 tabs (15 mg) daily for 2 days, then 2 tabs (10 mg) daily for 2 days, then 1 tab (5 mg) daily for 2 days. 24  Yes  Ale Callejas MD        PAT ROS:   Constitutional:   neg    Neuro/Psych:   neg    Eyes:    use of corrective lenses  Ears:   neg    Nose:   neg    Mouth:   Throat:   neg    Neck:   neg    Cardio:   neg    Respiratory:   neg    Endocrine:   neg    GI:   neg    :   neg    Musculoskeletal:    arthralgias  Hematologic:   neg    Skin:  neg        Physical Exam  Vitals reviewed.   Constitutional:       Appearance: Normal appearance.   HENT:      Head: Normocephalic and atraumatic.      Nose: Nose normal.      Mouth/Throat:      Mouth: Mucous membranes are moist.   Cardiovascular:      Rate and Rhythm: Normal rate and regular rhythm.      Pulses: Normal pulses.   Pulmonary:      Effort: Pulmonary effort is normal.      Breath sounds: Normal breath sounds.   Abdominal:      Palpations: Abdomen is soft.   Musculoskeletal:         General: Normal range of motion.      Cervical back: Normal range of motion.   Skin:     General: Skin is warm.   Neurological:      General: No focal deficit present.      Mental Status: She is alert and oriented to person, place, and time.   Psychiatric:         Mood and Affect: Mood normal.         Behavior: Behavior normal.        PAT AIRWAY:   Airway:     Mallampati::  II    TM distance::  >3 FB    Neck ROM::  Full   upper dentures and lower dentures      Visit Vitals  BP 98/68   Pulse 100   Temp 36.3 °C (97.4 °F)   Resp 16       DASI Risk Score      Flowsheet Row Most Recent Value   DASI SCORE 58.2   METS Score (Will be calculated only when all the questions are answered) 9.9          Caprini DVT Assessment      Flowsheet Row Most Recent Value   DVT Score 6   Current Status Major surgery planned, lasting 2-3 hours   Age 60-75 years   BMI 30 or less          Modified Frailty Index      Flowsheet Row Most Recent Value   Modified Frailty Index Calculator .0909          CHADS2 Stroke Risk  Current as of 29 minutes ago        N/A 3 to 100%: High Risk   2 to < 3%: Medium Risk   0 to < 2%: Low  Risk     Last Change: N/A          This score determines the patient's risk of having a stroke if the patient has atrial fibrillation.        This score is not applicable to this patient. Components are not calculated.          Revised Cardiac Risk Index    No data to display       Apfel Simplified Score      Flowsheet Row Most Recent Value   Apfel Simplified Score Calculator 3          Risk Analysis Index Results This Encounter    No data found in the last 1 encounters.       Stop Bang Score      Flowsheet Row Most Recent Value   Do you snore loudly? 0   Do you often feel tired or fatigued after your sleep? 0   Has anyone ever observed you stop breathing in your sleep? 0   Do you have or are you being treated for high blood pressure? 1   Recent BMI (Calculated) 27.8   Is BMI greater than 35 kg/m2? 0=No   Age older than 50 years old? 1=Yes   Is your neck circumference greater than 17 inches (Male) or 16 inches (Female)? 0   Gender - Male 0=No   STOP-BANG Total Score 2            Assessment and Plan:     Anesthesia:  The patient denies problems with anesthesia in the past such as PONV, prolonged sedation, awareness, dental damage, aspiration, cardiac arrest, difficult intubation, or unexpected hospital admissions.     Neuro:   The patient has no neurological diagnoses or significant findings on chart review, clinical presentation, and evaluation. No grossly apparent neurological perioperative risk.    HEENT/Airway  No diagnoses, significant findings on chart review, clinical presentation, or evaluation.    Cardiovascular  The patient is scheduled for non-cardiac surgery associated with elevated risk. The patient has no major cardiac contraindications to non- cardiac surgery.  RCRI  The patient meets 0-1 RCRI criteria and therefore has a less than 1% risk of major adverse cardiac complications.  METS  The patient's functional capacity capacity is less than 4 METS.  EKG  The patient has no EKG or echocardiographic  changes concerning for myocardial ischemia.   Heart Failure  The patient has no known history of heart failure.  Additionally, the patient reports no symptoms of heart failure and demonstrates no signs of heart failure.    Pts chart has documentation stating CHF, pt denies diagnosis last ECHO has a EF of 65% pt is asymptomatic    Hypertension Evaluation  The patient has a known history of hypertension that is controlled.  Patient's hypertension is most consistent with stage 1.  Heart Rhythm Evaluation  The patient has no history of arrhythmias.  Heart Valve Evaluation  The patient has no known history of valvular heart disease. The patient has no symptoms or physical exam findings to suggest valvular heart disease.  Cardiology Evaluation  The patient is not followed by cardiology.    The patient has a 30-day risk for MACE of 0 predictors, 3.9% risk for cardiac death, nonfatal myocardial infarction, and nonfatal cardiac arrest.  AMILCAR score which indicates a 0.1% risk of intraoperative or 30-day postoperative MACE (major adverse cardiac event).    Pulmonary   No significant findings on chart review or clinical presentation and evaluation. The patient is at increased risk of perioperative pulmonary complications secondary to advanced age greater than 60.    The patient has a stop bang score of 2, which places patient at low risk for having LAURENCE.    ARISCAT 19, low, 1.6% risk of in-hospital postoperative pulmonary complications  PRODIGY 8, intermediate risk of respiratory depression episode. Patient given PI sheet for preoperative deep breathing exercises.    Hematology  No diagnoses or significant findings on chart review or clinical presentation and evaluation.  Antiplatelet management   The patient is not currently receiving antiplatelet therapy.  Anticoagulation management  The patient is not currently receiving anticoagulation therapy.    Caprini score 6, high risk of perioperative VTE.     Patient instructed to  ambulate as soon as possible postoperatively to decrease thromboembolic risk. Initiate mechanical DVT prophylaxis as soon as possible and initiate chemical prophylaxis when deemed safe from a bleeding standpoint post surgery.     Transfusion Evaluation  A type and screen was obtained given the likelihood for perioperative transfusion of blood or blood products.    Gastrointestinal  No diagnoses or significant findings on chart review or clinical presentation and evaluation.    Eat 10- 0,  self-perceived oropharyngeal dysphagia scale (0-40)     Genitourinary  No diagnoses or significant findings on chart review or clinical presentation and evaluation.    Renal  No renal diagnoses or significant findings on chart review or clinical presentation and evaluation. The patient has specific risk factors associated with increased risk of perioperative renal complications related to age greater than 55, hypertension.    Musculoskeletal  The patient has diagnoses or significant findings on chart review or clinical presentation and evaluation significant for osteoarthritis    Endocrine  Diabetes Evaluation  The patient has no history of diabetes mellitus.  Thyroid Disease Evaluation  The patient has no history of thyroid disease.    ID  No diagnoses or significant findings on chart review or clinical presentation and evaluation. MRSA screening obtained. Prescriptions and instructions given for Hibiclens and Peridex.    -Preoperative medication instructions were provided and reviewed with the patient.  Any additional testing or evaluation was explained to the patient.  NPO Instructions were discussed, and the patient's questions were answered prior to conclusion of this encounter.     Recent Results (from the past 168 hour(s))   Staphylococcus aureus/MRSA colonization, Culture    Collection Time: 07/12/24 11:52 AM    Specimen: Nares/Axilla/Groin; Swab   Result Value Ref Range    Staph/MRSA Screen Culture No Staphylococcus aureus  isolated    CBC    Collection Time: 07/12/24 11:52 AM   Result Value Ref Range    WBC 3.4 (L) 4.4 - 11.3 x10*3/uL    nRBC 0.0 0.0 - 0.0 /100 WBCs    RBC 4.29 4.00 - 5.20 x10*6/uL    Hemoglobin 13.6 12.0 - 16.0 g/dL    Hematocrit 38.7 36.0 - 46.0 %    MCV 90 80 - 100 fL    MCH 31.7 26.0 - 34.0 pg    MCHC 35.1 32.0 - 36.0 g/dL    RDW 11.0 (L) 11.5 - 14.5 %    Platelets 177 150 - 450 x10*3/uL   Basic Metabolic Panel    Collection Time: 07/12/24 11:52 AM   Result Value Ref Range    Glucose 95 74 - 99 mg/dL    Sodium 133 (L) 136 - 145 mmol/L    Potassium 4.2 3.5 - 5.3 mmol/L    Chloride 96 (L) 98 - 107 mmol/L    Bicarbonate 28 21 - 32 mmol/L    Anion Gap 13 10 - 20 mmol/L    Urea Nitrogen 21 6 - 23 mg/dL    Creatinine 0.79 0.50 - 1.05 mg/dL    eGFR 85 >60 mL/min/1.73m*2    Calcium 10.2 8.6 - 10.6 mg/dL   Type And Screen    Collection Time: 07/12/24 11:52 AM   Result Value Ref Range    ABO TYPE A     Rh TYPE POS     ANTIBODY SCREEN NEG

## 2024-07-13 LAB — STAPHYLOCOCCUS SPEC CULT: NORMAL

## 2024-07-17 ENCOUNTER — PREP FOR PROCEDURE (OUTPATIENT)
Dept: ORTHOPEDIC SURGERY | Facility: HOSPITAL | Age: 64
End: 2024-07-17
Payer: COMMERCIAL

## 2024-07-18 LAB
ATRIAL RATE: 89 BPM
P AXIS: 70 DEGREES
P OFFSET: 198 MS
P ONSET: 146 MS
PR INTERVAL: 150 MS
Q ONSET: 221 MS
QRS COUNT: 14 BEATS
QRS DURATION: 74 MS
QT INTERVAL: 360 MS
QTC CALCULATION(BAZETT): 438 MS
QTC FREDERICIA: 410 MS
R AXIS: 73 DEGREES
T AXIS: 53 DEGREES
T OFFSET: 401 MS
VENTRICULAR RATE: 89 BPM

## 2024-07-18 PROCEDURE — 93005 ELECTROCARDIOGRAM TRACING: CPT

## 2024-07-23 ENCOUNTER — APPOINTMENT (OUTPATIENT)
Dept: PRIMARY CARE | Facility: CLINIC | Age: 64
End: 2024-07-23
Payer: COMMERCIAL

## 2024-07-23 ENCOUNTER — HOSPITAL ENCOUNTER (OUTPATIENT)
Dept: RADIOLOGY | Facility: HOSPITAL | Age: 64
Discharge: HOME | End: 2024-07-23
Payer: COMMERCIAL

## 2024-07-23 DIAGNOSIS — M17.11 UNILATERAL PRIMARY OSTEOARTHRITIS, RIGHT KNEE: ICD-10-CM

## 2024-07-23 PROCEDURE — 77073 BONE LENGTH STUDIES: CPT

## 2024-07-23 PROCEDURE — 73562 X-RAY EXAM OF KNEE 3: CPT | Mod: RT

## 2024-07-24 ENCOUNTER — HOME HEALTH ADMISSION (OUTPATIENT)
Dept: HOME HEALTH SERVICES | Facility: HOME HEALTH | Age: 64
End: 2024-07-24
Payer: COMMERCIAL

## 2024-07-24 ENCOUNTER — HOSPITAL ENCOUNTER (INPATIENT)
Facility: HOSPITAL | Age: 64
LOS: 1 days | Discharge: HOME | End: 2024-07-25
Attending: ORTHOPAEDIC SURGERY | Admitting: ORTHOPAEDIC SURGERY
Payer: COMMERCIAL

## 2024-07-24 ENCOUNTER — ANESTHESIA (OUTPATIENT)
Dept: OPERATING ROOM | Facility: HOSPITAL | Age: 64
End: 2024-07-24
Payer: COMMERCIAL

## 2024-07-24 ENCOUNTER — ANESTHESIA EVENT (OUTPATIENT)
Dept: OPERATING ROOM | Facility: HOSPITAL | Age: 64
End: 2024-07-24
Payer: COMMERCIAL

## 2024-07-24 ENCOUNTER — DOCUMENTATION (OUTPATIENT)
Dept: HOME HEALTH SERVICES | Facility: HOME HEALTH | Age: 64
End: 2024-07-24

## 2024-07-24 DIAGNOSIS — G89.18 POSTOPERATIVE PAIN: ICD-10-CM

## 2024-07-24 DIAGNOSIS — M17.11 UNILATERAL PRIMARY OSTEOARTHRITIS, RIGHT KNEE: ICD-10-CM

## 2024-07-24 DIAGNOSIS — M17.12 PRIMARY OSTEOARTHRITIS OF LEFT KNEE: Primary | ICD-10-CM

## 2024-07-24 LAB
ABO GROUP (TYPE) IN BLOOD: NORMAL
RH FACTOR (ANTIGEN D): NORMAL

## 2024-07-24 PROCEDURE — 2500000001 HC RX 250 WO HCPCS SELF ADMINISTERED DRUGS (ALT 637 FOR MEDICARE OP)

## 2024-07-24 PROCEDURE — 27447 TOTAL KNEE ARTHROPLASTY: CPT | Performed by: ORTHOPAEDIC SURGERY

## 2024-07-24 PROCEDURE — C1776 JOINT DEVICE (IMPLANTABLE): HCPCS | Performed by: ORTHOPAEDIC SURGERY

## 2024-07-24 PROCEDURE — 3700000002 HC GENERAL ANESTHESIA TIME - EACH INCREMENTAL 1 MINUTE: Performed by: ORTHOPAEDIC SURGERY

## 2024-07-24 PROCEDURE — 36415 COLL VENOUS BLD VENIPUNCTURE: CPT | Performed by: ANESTHESIOLOGY

## 2024-07-24 PROCEDURE — 0SRC0J9 REPLACEMENT OF RIGHT KNEE JOINT WITH SYNTHETIC SUBSTITUTE, CEMENTED, OPEN APPROACH: ICD-10-PCS | Performed by: ORTHOPAEDIC SURGERY

## 2024-07-24 PROCEDURE — 7100000001 HC RECOVERY ROOM TIME - INITIAL BASE CHARGE: Performed by: ORTHOPAEDIC SURGERY

## 2024-07-24 PROCEDURE — 3600000005 HC OR TIME - INITIAL BASE CHARGE - PROCEDURE LEVEL FIVE: Performed by: ORTHOPAEDIC SURGERY

## 2024-07-24 PROCEDURE — 2500000004 HC RX 250 GENERAL PHARMACY W/ HCPCS (ALT 636 FOR OP/ED)

## 2024-07-24 PROCEDURE — 2500000005 HC RX 250 GENERAL PHARMACY W/O HCPCS: Performed by: ORTHOPAEDIC SURGERY

## 2024-07-24 PROCEDURE — 76942 ECHO GUIDE FOR BIOPSY: CPT | Performed by: STUDENT IN AN ORGANIZED HEALTH CARE EDUCATION/TRAINING PROGRAM

## 2024-07-24 PROCEDURE — 2500000004 HC RX 250 GENERAL PHARMACY W/ HCPCS (ALT 636 FOR OP/ED): Performed by: ORTHOPAEDIC SURGERY

## 2024-07-24 PROCEDURE — 2720000007 HC OR 272 NO HCPCS: Performed by: ORTHOPAEDIC SURGERY

## 2024-07-24 PROCEDURE — RXMED WILLOW AMBULATORY MEDICATION CHARGE

## 2024-07-24 PROCEDURE — 7100000002 HC RECOVERY ROOM TIME - EACH INCREMENTAL 1 MINUTE: Performed by: ORTHOPAEDIC SURGERY

## 2024-07-24 PROCEDURE — 2500000004 HC RX 250 GENERAL PHARMACY W/ HCPCS (ALT 636 FOR OP/ED): Performed by: NURSE ANESTHETIST, CERTIFIED REGISTERED

## 2024-07-24 PROCEDURE — 3700000001 HC GENERAL ANESTHESIA TIME - INITIAL BASE CHARGE: Performed by: ORTHOPAEDIC SURGERY

## 2024-07-24 PROCEDURE — 3600000010 HC OR TIME - EACH INCREMENTAL 1 MINUTE - PROCEDURE LEVEL FIVE: Performed by: ORTHOPAEDIC SURGERY

## 2024-07-24 PROCEDURE — 99222 1ST HOSP IP/OBS MODERATE 55: CPT | Performed by: STUDENT IN AN ORGANIZED HEALTH CARE EDUCATION/TRAINING PROGRAM

## 2024-07-24 PROCEDURE — C1713 ANCHOR/SCREW BN/BN,TIS/BN: HCPCS | Performed by: ORTHOPAEDIC SURGERY

## 2024-07-24 PROCEDURE — 2500000005 HC RX 250 GENERAL PHARMACY W/O HCPCS: Performed by: NURSE ANESTHETIST, CERTIFIED REGISTERED

## 2024-07-24 PROCEDURE — 2780000003 HC OR 278 NO HCPCS: Performed by: ORTHOPAEDIC SURGERY

## 2024-07-24 PROCEDURE — 1100000001 HC PRIVATE ROOM DAILY

## 2024-07-24 DEVICE — FEMORAL DISTAL FIXATION PEG
Type: IMPLANTABLE DEVICE | Site: KNEE | Status: FUNCTIONAL
Brand: TRIATHLON

## 2024-07-24 DEVICE — POSTERIOR STABILIZED FEMORAL
Type: IMPLANTABLE DEVICE | Site: KNEE | Status: FUNCTIONAL
Brand: TRIATHLON

## 2024-07-24 DEVICE — IMPLANTABLE DEVICE: Type: IMPLANTABLE DEVICE | Site: KNEE | Status: FUNCTIONAL

## 2024-07-24 DEVICE — IMPLANTABLE DEVICE
Type: IMPLANTABLE DEVICE | Site: KNEE | Status: NON-FUNCTIONAL
Brand: REFOBACIN® BONE CEMENT R

## 2024-07-24 DEVICE — UNIVERSAL TIBIAL BASEPLATE
Type: IMPLANTABLE DEVICE | Site: KNEE | Status: FUNCTIONAL
Brand: TRIATHLON

## 2024-07-24 DEVICE — ASYMMETRIC PATELLA
Type: IMPLANTABLE DEVICE | Site: KNEE | Status: FUNCTIONAL
Brand: TRIATHLON

## 2024-07-24 RX ORDER — ONDANSETRON 4 MG/1
4 TABLET, FILM COATED ORAL EVERY 8 HOURS PRN
Status: DISCONTINUED | OUTPATIENT
Start: 2024-07-24 | End: 2024-07-25 | Stop reason: HOSPADM

## 2024-07-24 RX ORDER — POLYETHYLENE GLYCOL 3350 17 G/17G
17 POWDER, FOR SOLUTION ORAL DAILY
Status: DISCONTINUED | OUTPATIENT
Start: 2024-07-24 | End: 2024-07-25 | Stop reason: HOSPADM

## 2024-07-24 RX ORDER — PROPOFOL 10 MG/ML
INJECTION, EMULSION INTRAVENOUS CONTINUOUS PRN
Status: DISCONTINUED | OUTPATIENT
Start: 2024-07-24 | End: 2024-07-24

## 2024-07-24 RX ORDER — LABETALOL HYDROCHLORIDE 5 MG/ML
5 INJECTION, SOLUTION INTRAVENOUS ONCE AS NEEDED
Status: DISCONTINUED | OUTPATIENT
Start: 2024-07-24 | End: 2024-07-24 | Stop reason: HOSPADM

## 2024-07-24 RX ORDER — ONDANSETRON HYDROCHLORIDE 2 MG/ML
4 INJECTION, SOLUTION INTRAVENOUS ONCE AS NEEDED
Status: DISCONTINUED | OUTPATIENT
Start: 2024-07-24 | End: 2024-07-24 | Stop reason: HOSPADM

## 2024-07-24 RX ORDER — ACETAMINOPHEN 325 MG/1
650 TABLET ORAL ONCE
Status: DISCONTINUED | OUTPATIENT
Start: 2024-07-24 | End: 2024-07-24 | Stop reason: HOSPADM

## 2024-07-24 RX ORDER — CEFAZOLIN SODIUM 2 G/100ML
2 INJECTION, SOLUTION INTRAVENOUS EVERY 8 HOURS
Status: COMPLETED | OUTPATIENT
Start: 2024-07-24 | End: 2024-07-25

## 2024-07-24 RX ORDER — NALOXONE HYDROCHLORIDE 0.4 MG/ML
0.2 INJECTION, SOLUTION INTRAMUSCULAR; INTRAVENOUS; SUBCUTANEOUS EVERY 5 MIN PRN
Status: DISCONTINUED | OUTPATIENT
Start: 2024-07-24 | End: 2024-07-24

## 2024-07-24 RX ORDER — LIDOCAINE HYDROCHLORIDE 10 MG/ML
0.1 INJECTION INFILTRATION; PERINEURAL ONCE
Status: DISCONTINUED | OUTPATIENT
Start: 2024-07-24 | End: 2024-07-24 | Stop reason: HOSPADM

## 2024-07-24 RX ORDER — CEFAZOLIN 1 G/1
INJECTION, POWDER, FOR SOLUTION INTRAVENOUS AS NEEDED
Status: DISCONTINUED | OUTPATIENT
Start: 2024-07-24 | End: 2024-07-24

## 2024-07-24 RX ORDER — HYDROMORPHONE HYDROCHLORIDE 1 MG/ML
0.5 INJECTION, SOLUTION INTRAMUSCULAR; INTRAVENOUS; SUBCUTANEOUS EVERY 4 HOURS PRN
Status: DISCONTINUED | OUTPATIENT
Start: 2024-07-24 | End: 2024-07-25 | Stop reason: HOSPADM

## 2024-07-24 RX ORDER — HYDROMORPHONE HYDROCHLORIDE 1 MG/ML
0.2 INJECTION, SOLUTION INTRAMUSCULAR; INTRAVENOUS; SUBCUTANEOUS EVERY 5 MIN PRN
Status: DISCONTINUED | OUTPATIENT
Start: 2024-07-24 | End: 2024-07-24 | Stop reason: HOSPADM

## 2024-07-24 RX ORDER — CYCLOBENZAPRINE HCL 10 MG
10 TABLET ORAL 3 TIMES DAILY PRN
Status: DISCONTINUED | OUTPATIENT
Start: 2024-07-24 | End: 2024-07-25 | Stop reason: HOSPADM

## 2024-07-24 RX ORDER — OXYCODONE HYDROCHLORIDE 5 MG/1
10 TABLET ORAL EVERY 4 HOURS PRN
Status: DISCONTINUED | OUTPATIENT
Start: 2024-07-24 | End: 2024-07-25 | Stop reason: HOSPADM

## 2024-07-24 RX ORDER — SODIUM CHLORIDE, SODIUM LACTATE, POTASSIUM CHLORIDE, CALCIUM CHLORIDE 600; 310; 30; 20 MG/100ML; MG/100ML; MG/100ML; MG/100ML
100 INJECTION, SOLUTION INTRAVENOUS CONTINUOUS
Status: DISCONTINUED | OUTPATIENT
Start: 2024-07-24 | End: 2024-07-24 | Stop reason: HOSPADM

## 2024-07-24 RX ORDER — BUPIVACAINE HYDROCHLORIDE 5 MG/ML
INJECTION, SOLUTION EPIDURAL; INTRACAUDAL AS NEEDED
Status: DISCONTINUED | OUTPATIENT
Start: 2024-07-24 | End: 2024-07-24

## 2024-07-24 RX ORDER — ASPIRIN 81 MG/1
81 TABLET ORAL 2 TIMES DAILY
Status: DISCONTINUED | OUTPATIENT
Start: 2024-07-24 | End: 2024-07-25 | Stop reason: HOSPADM

## 2024-07-24 RX ORDER — PROCHLORPERAZINE MALEATE 10 MG
10 TABLET ORAL EVERY 6 HOURS PRN
Status: DISCONTINUED | OUTPATIENT
Start: 2024-07-24 | End: 2024-07-25 | Stop reason: HOSPADM

## 2024-07-24 RX ORDER — DEXAMETHASONE SODIUM PHOSPHATE 10 MG/ML
INJECTION INTRAMUSCULAR; INTRAVENOUS AS NEEDED
Status: DISCONTINUED | OUTPATIENT
Start: 2024-07-24 | End: 2024-07-24

## 2024-07-24 RX ORDER — OXYCODONE HYDROCHLORIDE 5 MG/1
5 TABLET ORAL EVERY 4 HOURS PRN
Status: DISCONTINUED | OUTPATIENT
Start: 2024-07-24 | End: 2024-07-24 | Stop reason: HOSPADM

## 2024-07-24 RX ORDER — MIDAZOLAM HYDROCHLORIDE 1 MG/ML
1 INJECTION INTRAMUSCULAR; INTRAVENOUS ONCE AS NEEDED
Status: DISCONTINUED | OUTPATIENT
Start: 2024-07-24 | End: 2024-07-24 | Stop reason: HOSPADM

## 2024-07-24 RX ORDER — ADHESIVE BANDAGE
30 BANDAGE TOPICAL DAILY PRN
Status: DISCONTINUED | OUTPATIENT
Start: 2024-07-24 | End: 2024-07-25 | Stop reason: HOSPADM

## 2024-07-24 RX ORDER — PHENYLEPHRINE HCL IN 0.9% NACL 0.4MG/10ML
SYRINGE (ML) INTRAVENOUS AS NEEDED
Status: DISCONTINUED | OUTPATIENT
Start: 2024-07-24 | End: 2024-07-24

## 2024-07-24 RX ORDER — PROPOFOL 10 MG/ML
INJECTION, EMULSION INTRAVENOUS AS NEEDED
Status: DISCONTINUED | OUTPATIENT
Start: 2024-07-24 | End: 2024-07-24

## 2024-07-24 RX ORDER — SODIUM CHLORIDE, SODIUM LACTATE, POTASSIUM CHLORIDE, CALCIUM CHLORIDE 600; 310; 30; 20 MG/100ML; MG/100ML; MG/100ML; MG/100ML
100 INJECTION, SOLUTION INTRAVENOUS CONTINUOUS
Status: DISCONTINUED | OUTPATIENT
Start: 2024-07-24 | End: 2024-07-25 | Stop reason: HOSPADM

## 2024-07-24 RX ORDER — SODIUM CHLORIDE 0.9 G/100ML
IRRIGANT IRRIGATION AS NEEDED
Status: DISCONTINUED | OUTPATIENT
Start: 2024-07-24 | End: 2024-07-24 | Stop reason: HOSPADM

## 2024-07-24 RX ORDER — SODIUM CHLORIDE, SODIUM LACTATE, POTASSIUM CHLORIDE, CALCIUM CHLORIDE 600; 310; 30; 20 MG/100ML; MG/100ML; MG/100ML; MG/100ML
INJECTION, SOLUTION INTRAVENOUS CONTINUOUS PRN
Status: DISCONTINUED | OUTPATIENT
Start: 2024-07-24 | End: 2024-07-24

## 2024-07-24 RX ORDER — HYDROMORPHONE HYDROCHLORIDE 1 MG/ML
0.5 INJECTION, SOLUTION INTRAMUSCULAR; INTRAVENOUS; SUBCUTANEOUS EVERY 5 MIN PRN
Status: DISCONTINUED | OUTPATIENT
Start: 2024-07-24 | End: 2024-07-24 | Stop reason: HOSPADM

## 2024-07-24 RX ORDER — PROCHLORPERAZINE EDISYLATE 5 MG/ML
10 INJECTION INTRAMUSCULAR; INTRAVENOUS EVERY 6 HOURS PRN
Status: DISCONTINUED | OUTPATIENT
Start: 2024-07-24 | End: 2024-07-25 | Stop reason: HOSPADM

## 2024-07-24 RX ORDER — PROCHLORPERAZINE 25 MG/1
25 SUPPOSITORY RECTAL EVERY 12 HOURS PRN
Status: DISCONTINUED | OUTPATIENT
Start: 2024-07-24 | End: 2024-07-25 | Stop reason: HOSPADM

## 2024-07-24 RX ORDER — MIDAZOLAM HYDROCHLORIDE 1 MG/ML
INJECTION INTRAMUSCULAR; INTRAVENOUS AS NEEDED
Status: DISCONTINUED | OUTPATIENT
Start: 2024-07-24 | End: 2024-07-24

## 2024-07-24 RX ORDER — ACETAMINOPHEN 325 MG/1
650 TABLET ORAL EVERY 6 HOURS SCHEDULED
Status: DISCONTINUED | OUTPATIENT
Start: 2024-07-24 | End: 2024-07-25 | Stop reason: HOSPADM

## 2024-07-24 RX ORDER — BISACODYL 10 MG/1
10 SUPPOSITORY RECTAL DAILY PRN
Status: DISCONTINUED | OUTPATIENT
Start: 2024-07-24 | End: 2024-07-25 | Stop reason: HOSPADM

## 2024-07-24 RX ORDER — AMOXICILLIN 250 MG
2 CAPSULE ORAL 2 TIMES DAILY
Status: DISCONTINUED | OUTPATIENT
Start: 2024-07-24 | End: 2024-07-25 | Stop reason: HOSPADM

## 2024-07-24 RX ORDER — FENTANYL CITRATE 50 UG/ML
INJECTION, SOLUTION INTRAMUSCULAR; INTRAVENOUS AS NEEDED
Status: DISCONTINUED | OUTPATIENT
Start: 2024-07-24 | End: 2024-07-24

## 2024-07-24 RX ORDER — OXYCODONE HYDROCHLORIDE 5 MG/1
5 TABLET ORAL EVERY 4 HOURS PRN
Status: DISCONTINUED | OUTPATIENT
Start: 2024-07-24 | End: 2024-07-25 | Stop reason: HOSPADM

## 2024-07-24 RX ORDER — ONDANSETRON HYDROCHLORIDE 2 MG/ML
4 INJECTION, SOLUTION INTRAVENOUS EVERY 8 HOURS PRN
Status: DISCONTINUED | OUTPATIENT
Start: 2024-07-24 | End: 2024-07-25 | Stop reason: HOSPADM

## 2024-07-24 RX ORDER — DOCUSATE SODIUM 100 MG/1
100 CAPSULE, LIQUID FILLED ORAL 2 TIMES DAILY
Qty: 60 CAPSULE | Refills: 2 | Status: SHIPPED | OUTPATIENT
Start: 2024-07-24 | End: 2024-08-24

## 2024-07-24 RX ORDER — EPINEPHRINE 1 MG/ML
INJECTION INTRAMUSCULAR; INTRAVENOUS; SUBCUTANEOUS AS NEEDED
Status: DISCONTINUED | OUTPATIENT
Start: 2024-07-24 | End: 2024-07-24

## 2024-07-24 RX ORDER — OXYCODONE AND ACETAMINOPHEN 5; 325 MG/1; MG/1
1 TABLET ORAL EVERY 6 HOURS PRN
Qty: 28 TABLET | Refills: 0 | Status: SHIPPED | OUTPATIENT
Start: 2024-07-24

## 2024-07-24 RX ORDER — DIPHENHYDRAMINE HYDROCHLORIDE 50 MG/ML
12.5 INJECTION INTRAMUSCULAR; INTRAVENOUS EVERY 6 HOURS PRN
Status: DISCONTINUED | OUTPATIENT
Start: 2024-07-24 | End: 2024-07-25 | Stop reason: HOSPADM

## 2024-07-24 RX ORDER — NALOXONE HYDROCHLORIDE 0.4 MG/ML
0.2 INJECTION, SOLUTION INTRAMUSCULAR; INTRAVENOUS; SUBCUTANEOUS EVERY 5 MIN PRN
Status: DISCONTINUED | OUTPATIENT
Start: 2024-07-24 | End: 2024-07-25 | Stop reason: HOSPADM

## 2024-07-24 RX ORDER — TRANEXAMIC ACID 100 MG/ML
INJECTION, SOLUTION INTRAVENOUS AS NEEDED
Status: DISCONTINUED | OUTPATIENT
Start: 2024-07-24 | End: 2024-07-24

## 2024-07-24 RX ORDER — FERROUS SULFATE, DRIED 160(50) MG
1 TABLET, EXTENDED RELEASE ORAL 2 TIMES DAILY
Status: DISCONTINUED | OUTPATIENT
Start: 2024-07-24 | End: 2024-07-25 | Stop reason: HOSPADM

## 2024-07-24 RX ORDER — ALBUTEROL SULFATE 0.83 MG/ML
2.5 SOLUTION RESPIRATORY (INHALATION) ONCE AS NEEDED
Status: DISCONTINUED | OUTPATIENT
Start: 2024-07-24 | End: 2024-07-24 | Stop reason: HOSPADM

## 2024-07-24 RX ORDER — ERGOCALCIFEROL 1.25 MG/1
1250 CAPSULE ORAL DAILY
Status: COMPLETED | OUTPATIENT
Start: 2024-07-24 | End: 2024-07-25

## 2024-07-24 RX ORDER — ASPIRIN 81 MG/1
81 TABLET ORAL 2 TIMES DAILY
Qty: 84 TABLET | Refills: 0 | Status: SHIPPED | OUTPATIENT
Start: 2024-07-24 | End: 2024-09-05

## 2024-07-24 RX ORDER — ONDANSETRON HYDROCHLORIDE 2 MG/ML
INJECTION, SOLUTION INTRAVENOUS AS NEEDED
Status: DISCONTINUED | OUTPATIENT
Start: 2024-07-24 | End: 2024-07-24

## 2024-07-24 RX ORDER — MEPERIDINE HYDROCHLORIDE 25 MG/ML
12.5 INJECTION INTRAMUSCULAR; INTRAVENOUS; SUBCUTANEOUS EVERY 10 MIN PRN
Status: DISCONTINUED | OUTPATIENT
Start: 2024-07-24 | End: 2024-07-24 | Stop reason: HOSPADM

## 2024-07-24 SDOH — HEALTH STABILITY: PHYSICAL HEALTH: ON AVERAGE, HOW MANY MINUTES DO YOU ENGAGE IN EXERCISE AT THIS LEVEL?: 0 MIN

## 2024-07-24 SDOH — ECONOMIC STABILITY: INCOME INSECURITY: IN THE LAST 12 MONTHS, WAS THERE A TIME WHEN YOU WERE NOT ABLE TO PAY THE MORTGAGE OR RENT ON TIME?: PATIENT DECLINED

## 2024-07-24 SDOH — SOCIAL STABILITY: SOCIAL NETWORK
DO YOU BELONG TO ANY CLUBS OR ORGANIZATIONS SUCH AS CHURCH GROUPS UNIONS, FRATERNAL OR ATHLETIC GROUPS, OR SCHOOL GROUPS?: NO

## 2024-07-24 SDOH — SOCIAL STABILITY: SOCIAL INSECURITY
WITHIN THE LAST YEAR, HAVE YOU BEEN KICKED, HIT, SLAPPED, OR OTHERWISE PHYSICALLY HURT BY YOUR PARTNER OR EX-PARTNER?: NO

## 2024-07-24 SDOH — HEALTH STABILITY: MENTAL HEALTH
HOW OFTEN DO YOU NEED TO HAVE SOMEONE HELP YOU WHEN YOU READ INSTRUCTIONS, PAMPHLETS, OR OTHER WRITTEN MATERIAL FROM YOUR DOCTOR OR PHARMACY?: RARELY

## 2024-07-24 SDOH — ECONOMIC STABILITY: INCOME INSECURITY: HOW HARD IS IT FOR YOU TO PAY FOR THE VERY BASICS LIKE FOOD, HOUSING, MEDICAL CARE, AND HEATING?: SOMEWHAT HARD

## 2024-07-24 SDOH — SOCIAL STABILITY: SOCIAL INSECURITY: WITHIN THE LAST YEAR, HAVE YOU BEEN AFRAID OF YOUR PARTNER OR EX-PARTNER?: NO

## 2024-07-24 SDOH — SOCIAL STABILITY: SOCIAL INSECURITY: HAS ANYONE EVER THREATENED TO HURT YOUR FAMILY OR YOUR PETS?: NO

## 2024-07-24 SDOH — SOCIAL STABILITY: SOCIAL INSECURITY
WITHIN THE LAST YEAR, HAVE TO BEEN RAPED OR FORCED TO HAVE ANY KIND OF SEXUAL ACTIVITY BY YOUR PARTNER OR EX-PARTNER?: NO

## 2024-07-24 SDOH — HEALTH STABILITY: MENTAL HEALTH: HOW OFTEN DO YOU HAVE 6 OR MORE DRINKS ON ONE OCCASION?: NEVER

## 2024-07-24 SDOH — ECONOMIC STABILITY: HOUSING INSECURITY: AT ANY TIME IN THE PAST 12 MONTHS, WERE YOU HOMELESS OR LIVING IN A SHELTER (INCLUDING NOW)?: NO

## 2024-07-24 SDOH — SOCIAL STABILITY: SOCIAL NETWORK: HOW OFTEN DO YOU ATTEND CHURCH OR RELIGIOUS SERVICES?: PATIENT DECLINED

## 2024-07-24 SDOH — SOCIAL STABILITY: SOCIAL INSECURITY: WERE YOU ABLE TO COMPLETE ALL THE BEHAVIORAL HEALTH SCREENINGS?: YES

## 2024-07-24 SDOH — SOCIAL STABILITY: SOCIAL NETWORK: HOW OFTEN DO YOU ATTENT MEETINGS OF THE CLUB OR ORGANIZATION YOU BELONG TO?: NEVER

## 2024-07-24 SDOH — SOCIAL STABILITY: SOCIAL INSECURITY: HAVE YOU HAD ANY THOUGHTS OF HARMING ANYONE ELSE?: NO

## 2024-07-24 SDOH — SOCIAL STABILITY: SOCIAL INSECURITY: WITHIN THE LAST YEAR, HAVE YOU BEEN HUMILIATED OR EMOTIONALLY ABUSED IN OTHER WAYS BY YOUR PARTNER OR EX-PARTNER?: NO

## 2024-07-24 SDOH — HEALTH STABILITY: MENTAL HEALTH
STRESS IS WHEN SOMEONE FEELS TENSE, NERVOUS, ANXIOUS, OR CAN'T SLEEP AT NIGHT BECAUSE THEIR MIND IS TROUBLED. HOW STRESSED ARE YOU?: NOT AT ALL

## 2024-07-24 SDOH — ECONOMIC STABILITY: INCOME INSECURITY: IN THE PAST 12 MONTHS, HAS THE ELECTRIC, GAS, OIL, OR WATER COMPANY THREATENED TO SHUT OFF SERVICE IN YOUR HOME?: NO

## 2024-07-24 SDOH — HEALTH STABILITY: MENTAL HEALTH: HOW OFTEN DO YOU HAVE A DRINK CONTAINING ALCOHOL?: NEVER

## 2024-07-24 SDOH — SOCIAL STABILITY: SOCIAL NETWORK: ARE YOU MARRIED, WIDOWED, DIVORCED, SEPARATED, NEVER MARRIED, OR LIVING WITH A PARTNER?: PATIENT DECLINED

## 2024-07-24 SDOH — SOCIAL STABILITY: SOCIAL INSECURITY: HAVE YOU HAD THOUGHTS OF HARMING ANYONE ELSE?: NO

## 2024-07-24 SDOH — SOCIAL STABILITY: SOCIAL INSECURITY: ARE YOU OR HAVE YOU BEEN THREATENED OR ABUSED PHYSICALLY, EMOTIONALLY, OR SEXUALLY BY ANYONE?: NO

## 2024-07-24 SDOH — ECONOMIC STABILITY: TRANSPORTATION INSECURITY
IN THE PAST 12 MONTHS, HAS LACK OF TRANSPORTATION KEPT YOU FROM MEETINGS, WORK, OR FROM GETTING THINGS NEEDED FOR DAILY LIVING?: NO

## 2024-07-24 SDOH — SOCIAL STABILITY: SOCIAL INSECURITY: ABUSE: ADULT

## 2024-07-24 SDOH — HEALTH STABILITY: MENTAL HEALTH: HOW MANY STANDARD DRINKS CONTAINING ALCOHOL DO YOU HAVE ON A TYPICAL DAY?: PATIENT DOES NOT DRINK

## 2024-07-24 SDOH — ECONOMIC STABILITY: TRANSPORTATION INSECURITY
IN THE PAST 12 MONTHS, HAS THE LACK OF TRANSPORTATION KEPT YOU FROM MEDICAL APPOINTMENTS OR FROM GETTING MEDICATIONS?: NO

## 2024-07-24 SDOH — SOCIAL STABILITY: SOCIAL NETWORK: HOW OFTEN DO YOU GET TOGETHER WITH FRIENDS OR RELATIVES?: MORE THAN THREE TIMES A WEEK

## 2024-07-24 SDOH — ECONOMIC STABILITY: FOOD INSECURITY: WITHIN THE PAST 12 MONTHS, THE FOOD YOU BOUGHT JUST DIDN'T LAST AND YOU DIDN'T HAVE MONEY TO GET MORE.: PATIENT DECLINED

## 2024-07-24 SDOH — HEALTH STABILITY: MENTAL HEALTH: CURRENT SMOKER: 0

## 2024-07-24 SDOH — ECONOMIC STABILITY: FOOD INSECURITY: WITHIN THE PAST 12 MONTHS, YOU WORRIED THAT YOUR FOOD WOULD RUN OUT BEFORE YOU GOT MONEY TO BUY MORE.: PATIENT DECLINED

## 2024-07-24 SDOH — HEALTH STABILITY: PHYSICAL HEALTH: ON AVERAGE, HOW MANY DAYS PER WEEK DO YOU ENGAGE IN MODERATE TO STRENUOUS EXERCISE (LIKE A BRISK WALK)?: 0 DAYS

## 2024-07-24 SDOH — SOCIAL STABILITY: SOCIAL INSECURITY: ARE THERE ANY APPARENT SIGNS OF INJURIES/BEHAVIORS THAT COULD BE RELATED TO ABUSE/NEGLECT?: NO

## 2024-07-24 SDOH — SOCIAL STABILITY: SOCIAL INSECURITY: DO YOU FEEL UNSAFE GOING BACK TO THE PLACE WHERE YOU ARE LIVING?: NO

## 2024-07-24 SDOH — SOCIAL STABILITY: SOCIAL INSECURITY: DOES ANYONE TRY TO KEEP YOU FROM HAVING/CONTACTING OTHER FRIENDS OR DOING THINGS OUTSIDE YOUR HOME?: NO

## 2024-07-24 SDOH — SOCIAL STABILITY: SOCIAL INSECURITY: DO YOU FEEL ANYONE HAS EXPLOITED OR TAKEN ADVANTAGE OF YOU FINANCIALLY OR OF YOUR PERSONAL PROPERTY?: NO

## 2024-07-24 SDOH — SOCIAL STABILITY: SOCIAL NETWORK
IN A TYPICAL WEEK, HOW MANY TIMES DO YOU TALK ON THE PHONE WITH FAMILY, FRIENDS, OR NEIGHBORS?: MORE THAN THREE TIMES A WEEK

## 2024-07-24 ASSESSMENT — PAIN SCALES - GENERAL
PAINLEVEL_OUTOF10: 0 - NO PAIN
PAINLEVEL_OUTOF10: 5 - MODERATE PAIN
PAINLEVEL_OUTOF10: 0 - NO PAIN
PAINLEVEL_OUTOF10: 0 - NO PAIN
PAINLEVEL_OUTOF10: 7
PAINLEVEL_OUTOF10: 0 - NO PAIN
PAINLEVEL_OUTOF10: 7
PAINLEVEL_OUTOF10: 0 - NO PAIN

## 2024-07-24 ASSESSMENT — ACTIVITIES OF DAILY LIVING (ADL)
BATHING: INDEPENDENT
LACK_OF_TRANSPORTATION: PATIENT DECLINED
WALKS IN HOME: NEEDS ASSISTANCE
PATIENT'S MEMORY ADEQUATE TO SAFELY COMPLETE DAILY ACTIVITIES?: NO
TOILETING: INDEPENDENT
HEARING - LEFT EAR: FUNCTIONAL
FEEDING YOURSELF: INDEPENDENT
JUDGMENT_ADEQUATE_SAFELY_COMPLETE_DAILY_ACTIVITIES: YES
ASSISTIVE_DEVICE: EYEGLASSES;WALKER;SHOWER CHAIR
GROOMING: INDEPENDENT
ADEQUATE_TO_COMPLETE_ADL: YES
DRESSING YOURSELF: INDEPENDENT
HEARING - RIGHT EAR: FUNCTIONAL

## 2024-07-24 ASSESSMENT — COLUMBIA-SUICIDE SEVERITY RATING SCALE - C-SSRS
6. HAVE YOU EVER DONE ANYTHING, STARTED TO DO ANYTHING, OR PREPARED TO DO ANYTHING TO END YOUR LIFE?: NO
2. HAVE YOU ACTUALLY HAD ANY THOUGHTS OF KILLING YOURSELF?: NO
1. IN THE PAST MONTH, HAVE YOU WISHED YOU WERE DEAD OR WISHED YOU COULD GO TO SLEEP AND NOT WAKE UP?: NO

## 2024-07-24 ASSESSMENT — LIFESTYLE VARIABLES
AUDIT-C TOTAL SCORE: 0
PRESCIPTION_ABUSE_PAST_12_MONTHS: NO
HOW OFTEN DO YOU HAVE 6 OR MORE DRINKS ON ONE OCCASION: NEVER
SUBSTANCE_ABUSE_PAST_12_MONTHS: NO
SKIP TO QUESTIONS 9-10: 1
AUDIT-C TOTAL SCORE: 0
HOW MANY STANDARD DRINKS CONTAINING ALCOHOL DO YOU HAVE ON A TYPICAL DAY: PATIENT DOES NOT DRINK
SKIP TO QUESTIONS 9-10: 1
AUDIT-C TOTAL SCORE: 0
HOW OFTEN DO YOU HAVE A DRINK CONTAINING ALCOHOL: NEVER

## 2024-07-24 ASSESSMENT — COGNITIVE AND FUNCTIONAL STATUS - GENERAL
STANDING UP FROM CHAIR USING ARMS: A LITTLE
PATIENT BASELINE BEDBOUND: NO
TURNING FROM BACK TO SIDE WHILE IN FLAT BAD: A LITTLE
STANDING UP FROM CHAIR USING ARMS: A LITTLE
MOVING TO AND FROM BED TO CHAIR: A LITTLE
CLIMB 3 TO 5 STEPS WITH RAILING: A LOT
TURNING FROM BACK TO SIDE WHILE IN FLAT BAD: A LITTLE
MOVING TO AND FROM BED TO CHAIR: A LITTLE
WALKING IN HOSPITAL ROOM: A LITTLE
MOBILITY SCORE: 18

## 2024-07-24 ASSESSMENT — PATIENT HEALTH QUESTIONNAIRE - PHQ9
1. LITTLE INTEREST OR PLEASURE IN DOING THINGS: NOT AT ALL
2. FEELING DOWN, DEPRESSED OR HOPELESS: NOT AT ALL
SUM OF ALL RESPONSES TO PHQ9 QUESTIONS 1 & 2: 0

## 2024-07-24 ASSESSMENT — PAIN - FUNCTIONAL ASSESSMENT
PAIN_FUNCTIONAL_ASSESSMENT: 0-10

## 2024-07-24 NOTE — CONSULTS
Consults  Acute Pain Service  Megan Wolf is a 63 y.o. year old female patient who presents for R knee replacement with Dr. Montero. Acute Pain consulted for block for postoperative pain control.     Anticipated Postop Pain Issues -   Palliative: typically relieved with IV analgesics and regional local anesthetics  Provocative: typically with movement  Quality: typically burning and aching  Radiation: typically none  Severity: typically severe 8-10/10  Timing: typically constant    Past Medical History:   Diagnosis Date    Arthritis     osteoarthritis of her left knee    Body mass index (BMI)30.0-30.9, adult 08/27/2021    Body mass index (BMI) of 30.0 to 30.9 in adult    CHF (congestive heart failure) (Multi)     Hypertension     Obesity, unspecified 08/27/2021    Obesity (BMI 30.0-34.9)    Vitamin B12 deficiency         Past Surgical History:   Procedure Laterality Date    COLONOSCOPY          Family History   Problem Relation Name Age of Onset    No Known Problems Mother      No Known Problems Father          Social History     Socioeconomic History    Marital status: Single     Spouse name: Not on file    Number of children: 0    Years of education: Not on file    Highest education level: Not on file   Occupational History    Not on file   Tobacco Use    Smoking status: Never     Passive exposure: Never    Smokeless tobacco: Never   Vaping Use    Vaping status: Never Used   Substance and Sexual Activity    Alcohol use: Not Currently    Drug use: Never    Sexual activity: Yes     Partners: Male     Birth control/protection: Post-menopausal   Other Topics Concern    Not on file   Social History Narrative    Not on file     Social Determinants of Health     Financial Resource Strain: Not on file   Food Insecurity: Not on file   Transportation Needs: Not on file   Physical Activity: Not on file   Stress: Not on file   Social Connections: Not on file   Intimate Partner Violence: Not on file   Housing Stability:  Not on file        No Known Allergies      Review of Systems  Gen: No fatigue, anorexia, insomnia, fever.   Eyes: No vision loss, double vision, drainage, eye pain.   ENT: No pharyngitis, dry mouth, no hearing changes or ear discharge  Cardiac: No chest pain, palpitations, syncope, near syncope.   Pulmonary: No shortness of breath, cough, hemoptysis.   Heme/lymph: No swollen glands, fever, bleeding.   GI: No abdominal pain, change in bowel habits, melena, hematemesis, hematochezia, nausea, vomiting, diarrhea.   : No discharge, dysuria, frequency, urgency, hematuria.  Endo: No polyuria or weight loss.   Musculoskeletal: Negative for any pain or loss of ROM/weakness  Skin: No rashes or lesions  Neuro: Normal speech, no numbness or weakness. No gait difficulties  Review of systems is otherwise negative unless stated above or in history of present illness.    Physical Exam:  Constitutional:  no distress, alert and cooperative  Eyes: clear sclera  Head/Neck: No apparent injury, trachea midline  Respiratory/Thorax: Patent airways, thorax symmetric, breathing comfortably  Cardiovascular: no pitting edema  Gastrointestinal: Nondistended  Musculoskeletal: ROM intact  Extremities: no clubbing  Neurological: alert, durham x4  Psychological: Appropriate affect    No results found for this or any previous visit (from the past 24 hour(s)).     Plan:  - R AC ss blocks performed preoperatively on 7/24  - Pain medications per primary team  - Will see on POD1 if inpatient    Acute Pain Team  pg [phone] ph [phone].   Agreed by attending

## 2024-07-24 NOTE — ANESTHESIA PROCEDURE NOTES
Spinal Block    Patient location during procedure: OR  Start time: 7/24/2024 8:32 AM  End time: 7/24/2024 8:37 AM  Reason for block: primary anesthetic  Staffing  Performed: attending   Authorized by: Yeison Carpenter MD    Performed by: JUWAN Morales    Preanesthetic Checklist  Completed: patient identified, IV checked, risks and benefits discussed, surgical consent, pre-op evaluation, timeout performed and sterile techniques followed  Block Timeout  RN/Licensed healthcare professional reads aloud to the Anesthesia provider and entire team: Patient identity, procedure with side and site, patient position, and as applicable the availability of implants/special equipment/special requirements.  Patient on coagulant treatment: no  Timeout performed at:   Spinal Block  Patient position: sitting  Prep: Betadine  Sterility prep: mask, hand hygiene, gloves, drape and cap  Sedation level: light sedation  Patient monitoring: blood pressure and continuous pulse oximetry  Approach: midline  Vertebral space: L4-5  Injection technique: single-shot  Needle  Needle type: Gianna   Needle gauge: 26 G  Needle length: 7 in  Free flowing CSF: yes    Assessment bilateral  Block outcome: block to be assessed in the OR  Procedure assessment: patient sedated but conversant throughout procedure and patient tolerated procedure well with no immediate complications

## 2024-07-24 NOTE — ANESTHESIA POSTPROCEDURE EVALUATION
Patient: Megan Wolf    Procedure Summary       Date: 07/24/24 Room / Location: [facility] OR 08 / [facility] OR    Anesthesia Start: 0822 Anesthesia Stop: 1115    Procedure: Knee Replacement Total Cement Unilat (Right: Knee) Diagnosis:       Unilateral primary osteoarthritis, right knee      (Unilateral primary osteoarthritis, right knee [M17.11])    Surgeons: Ian Montero MD Responsible Provider: Akin Bonds MD    Anesthesia Type: spinal ASA Status: 2            Anesthesia Type: spinal    Vitals Value Taken Time   /68 07/24/24 1245   Temp 35.9 °C (96.6 °F) 07/24/24 1110   Pulse 83 07/24/24 1254   Resp 14 07/24/24 1254   SpO2 100 % 07/24/24 1254   Vitals shown include unfiled device data.    Anesthesia Post Evaluation    Patient location during evaluation: PACU  Patient participation: complete - patient participated  Level of consciousness: awake  Pain management: adequate  Airway patency: patent  Cardiovascular status: acceptable  Respiratory status: acceptable  Hydration status: acceptable  Postoperative Nausea and Vomiting: none    There were no known notable events for this encounter.    
normal

## 2024-07-24 NOTE — OP NOTE
Knee Replacement Total Cement Unilat (R) Operative Note     Date: 2024  OR Location: [facility] OR    Name: Megan Wolf, : 1960, Age: 63 y.o., MRN: [id number], Sex: female    Diagnosis  Pre-op Diagnosis      * Unilateral primary osteoarthritis, right knee [M17.11] Post-op Diagnosis     * Unilateral primary osteoarthritis, right knee [M17.11]     Procedures  Knee Replacement Total Cement Unilat  94223 - [facility] ARTHRP KNE CONDYLE&PLATU MEDIAL&LAT COMPARTMENTS      Surgeons      * Ian Montero - Primary    Resident/Fellow/Other Assistant:  Surgeons and Role:     * Petros Huggins MD - Resident - Assisting    Procedure Summary  Anesthesia: Spinal  ASA: II  Anesthesia Staff: Anesthesiologist: Yeison Carpenter MD; Akin Bonds MD  CRNA: RAVEN Morales-CRNA  Estimated Blood Loss: 25 mL  Intra-op Medications:   Administrations occurring from 0815 to 1130 on 24:   Medication Name Total Dose   sodium chloride 0.9 % irrigation solution 4,000 mL   bupivacaine PF (Marcaine) 0.25 % (2.5 mg/mL) 30 mL, ketorolac (Toradol) 30 mg in sodium chloride 0.9% 30 mL syringe 43 mL              Anesthesia Record               Intraprocedure I/O Totals          Intake    Tranexamic Acid 0.00 mL    The total shown is the total volume documented since Anesthesia Start was filed.    Propofol Drip 0.00 mL    The total shown is the total volume documented since Anesthesia Start was filed.    lactated Ringer's 1010.00 mL    Total Intake 1010 mL       Output    Est. Blood Loss 50 mL    Total Output 50 mL       Net    Net Volume 960 mL          Specimen: No specimens collected     Staff:   Circulator: Remedios Samano Person: Lorna Cheney Circulator: Rashaad         Drains and/or Catheters: * None in log *    Tourniquet Times:     Total Tourniquet Time Documented:  Thigh (Right) - 76 minutes  Thigh (Right) - 37 minutes  Total: Thigh (Right) - 113 minutes      Implants:  Implants       Type Name Action Serial No.       Joint CEMENT, BONE, REFOBACIN, 1 X 40 - [id number] Wasted      Joint CEMENT, BONE, REFOBACIN, 1 X 40 - [id number] Implanted      Joint CEMENT, BONE, REFOBACIN, 1 X 40 - [id number] Implanted      Joint FEM COMP, TRIATH KACI PS P 5 RIGHT - SN/A - [id number] Implanted N/A     Joint PEG, FEMORAL DISTAL FIXATION - SN/A - [id number] Implanted N/A     Joint BASEPLATE, TIBIA 4 TS TRIATH - SN/A - [id number] Implanted N/A     Joint PATELLA, TRIATHLON, ASYMMETRIC X3, SZ-A38 11MM - SN/A - [id number] Implanted N/A     Joint TRIATHLON X3 INSERT PS 10MM SIZE 4 Implanted N/A              Findings: Severe tricompartmental osteoarthritis with marked deformity right knee.  Indications: Megan Wolf is an 63 y.o. female who is having surgery for Unilateral primary osteoarthritis, right knee [M17.11].  Pain loss of function deformity and loss of motion right knee.    The patient was seen in the preoperative area. The risks, benefits, complications, treatment options, non-operative alternatives, expected recovery and outcomes were discussed with the patient. The possibilities of reaction to medication, pulmonary aspiration, injury to surrounding structures, bleeding, recurrent infection, the need for additional procedures, failure to diagnose a condition, and creating a complication requiring transfusion or operation were discussed with the patient. The patient concurred with the proposed plan, giving informed consent.  The site of surgery was properly noted/marked if necessary per policy. The patient has been actively warmed in preoperative area. Preoperative antibiotics have been ordered and given within 1 hours of incision. Venous thrombosis prophylaxis have been ordered including bilateral sequential compression devices    Procedure Details: The patient was brought into the operating room.  After adequate spinal anesthesia and preoperative intravenous antibiotics, the  patient was positioned in the supine position, prepped  and draped in  usual manner.  Procedure was performed utilizing high-exchange  laminar flow with exhaust suits.  The tourniquet was inflated to 250  mmHg.  A midline incision was made and carried down through the  subcutaneous tissue.  Adequate hemostasis was obtained here and  throughout the case utilizing electrocautery.  Joint was opened with  a medial parapatellar incision.  Joint fluid was type 1.  There was  no sign of infection.  There was severe loss of articular cartilage  in all 3 compartments.  The remnants of the cruciate ligament and menisci were removed for exposure.  The osteophytes were removed over the femur.  The  intramedullary cutting guide was applied and all cuts were checked  for flatness and alignment.  The distal femoral cut was made in the  usual technique as calculated from preoperative films with a 6-degree  axis.  The sizing device for the femur was applied with the outrigger  to minimize the femoral resection and avoid notching.  The best fitting femoral  component was selected to be the best fitting component.  The  cutting guide was applied.  Anterior-posterior intercondylar cuts  were made in the usual technique.  Trial reduction showed good fit.   Our attention was focused on the tibia.  The tibial outrigger cutting  guide was applied in the usual technique with the outrigger to  minimize the tibial cut.  Once the tibial cut had been completed,  measurements were taken, and the appropriate tibial component was  selected.  Our attention was then focused on the tibia.  The tibia  was prepared in the usual technique with broaching.  At this point, a  posteromedial and posterolateral soft tissue release was  accomplished, and the posteromedial knee was injected with  bupivacaine solution for postoperative analgesia.  A trial reduction  was then performed.  The articular surface allowed for full  extension, full flexion and good soft tissue balance.  There was good  tracking of the  patella. Our attention was then focused on the  patella.  The patellar reaming system was utilized to remove the  remaining articular surface from the patella leaving 12 mm of  residual patella bone.  The patella was prepared with drill holes in  the usual technique.  All surfaces were then prepared for cementation  with water picking, drying, and bone graft into the femur  intramedullary canal.  The components were then cemented in the usual  technique with antibiotic-impregnated cement.  Once the cement had  cured, the wound was copiously irrigated with antibiotic irrigating  solution.  The articular surface of the tibia was locked into place.   Range of motion, soft tissue balance, alignment, and patellar  tracking were similar to trial.  The wound was copiously irrigated  with irrigating solution.  The tourniquet was released,  and adequate hemostasis was obtained.  The tourniquet was reinflated  for closure.  The wound was then closed in layers using absorbable  suture with nonabsorbable staples to the skin.  Prior to closing the  subcu, the joint was injected with bupivacaine solution for  postoperative analgesia.  A dry sterile dressing was applied.  The  patient tolerated the procedure well, left the operating room in  apparent good condition.         Complications:  None; patient tolerated the procedure well.    Disposition: PACU - hemodynamically stable.  Condition: stable         Attending Attestation: I was present and scrubbed for the entire procedure.    Ian Montero  Phone Number: [phone]

## 2024-07-24 NOTE — H&P
[facility] Department of Orthopaedic Surgery   Surgical History & Physical >30 Days  [unfilled]    Reason for Surgery: Right knee arthritis  Planned Procedure:  Right total knee replacement    History & Physical Reviewed:  This 63-year-old woman is here with her mother complaining of left knee pain.  Patient notes a week ago her left knee became very painful.  She denies any injury.  She notes the pain was lateral.  She denies any previous problems with the left knee.  The patient notes swelling in the knee.  She denies giving way or locking but notes stiffness.  Patient denies any crepitus in the knee.  She denies any neurologic symptoms in the lower extremity.     Complicating the patient's situation is her past medical history which includes hypertensive heart disease with heart failure and anemia.    Home medications were reviewed with significant updates noted below:  No significant changes.    Allergies:  No Known Allergies    Review of Systems:  Gen: Denies recent weight loss  Neuro: Denies recent confusion  Ophtho: Denies changes in vision  ENT: Denies changes in hearing  Endo: Denies weight loss/weight gain  CV: Denies chest pain  Resp: Denies shortness of breath  GI: Denies melena/hematochezia  : Denies painful urination  MSK: Per above HPI  Heme: No abnormal bleeding  Psych: Denies hallucinations    Physical Exam by System:     Constitutional: No acute distress, cooperative   Eyes: EOM grossly intact   Head/Neck: Trachea midline   Respiratory/Thorax: Normal work of breathing   Cardiovascular: RRR on peripheral palpation   Gastrointestinal: Nondistended   Extremities: moves extremities   Psychological: Appropriate mood/behavior   Skin: Warm and dry.       ERAS patient?: No    COVID-19 Risk Consent:   Surgeon has reviewed the key risks related to tk COVID-19 and subsequent sequelae.       Petros Huggins MD  PGY-3 Orthopedic Surgery  [facility]  Epic Chat Preferred  Pager:  [phone]

## 2024-07-24 NOTE — PROCEDURES
Peripheral Block    Patient location during procedure: pre-op  Start time: 7/24/2024 6:53 AM  End time: 7/24/2024 7:02 AM  Reason for block: at surgeon's request and post-op pain management  Staffing  Performed: resident   Authorized by: Macho Fuller MD    Performed by: Macho Fuller MD  Preanesthetic Checklist  Completed: patient identified, IV checked, site marked, risks and benefits discussed, surgical consent, monitors and equipment checked, pre-op evaluation and timeout performed   Timeout performed at: 7/24/2024 6:52 AM  Peripheral Block  Patient position: laying flat  Prep: ChloraPrep  Patient monitoring: heart rate and continuous pulse ox  Block type: adductor canal  Laterality: right  Injection technique: single-shot  Guidance: ultrasound guided  Local infiltration: ropivacaine  Infiltration strength: 0.5 %  Dose: 25 mL  Needle  Needle type: Tuohy   Needle gauge: 26 G  Needle length: 8 cm  Needle localization: ultrasound guidance     image stored in chart  Assessment  Injection assessment: negative aspiration for heme, no paresthesia on injection, incremental injection and local visualized surrounding nerve on ultrasound  Paresthesia pain: none  Heart rate change: no  Slow fractionated injection: yes  Additional Notes  Adductor canal block: Informed consent obtained.  Risks, benefits, and alternatives discussed.  ASA monitors placed, and timeout performed.  Patient positioned, prepped with chlorhexidine, and draped with sterile towels.  Ultrasound guidance was used to visualize the saphenous nerve at the adductor canal and surrounding structures with visualization of the needle throughout duration of the procedure.  Aspiration negative.  20 cc of 0.5% ropivacaine, dexamethasone 4 mg, and 1:200,000 epinephrine injected.  Patient tolerated the procedure well.    Timeout by pacu RICARDO kathleen

## 2024-07-24 NOTE — DISCHARGE INSTR - ACTIVITY
WEIGHT-BEARING INSTRUCTIONS  You may be weight-bearing as tolerated on your on your right leg at all times. You   DRIVING & TRAVEL AFTER SURGERY   Patients should anticipate waiting at least 4-6 weeks before traveling long distances after surgery.  You will need to stop to walk around ever 1 hour during your travel to help with blood clot prevention.  Please call the office or your joint nurse to discuss prior to post-surgical travel.  Patients may not drive until cleared by the joint nurse or the office.    PERSONAL HYGIENE  You may shower upon discharging from the hospital.  Soap and water is permitted to run over the surgical dressing and incision.  Do not scrub directly over these items.  DO NOT soak your incision in a bath, hot tub, pool or pond/lake for a minimum of 8 weeks following your surgery.  DO NOT use lotions, creams, ointments on your wound for a minimum of 6 weeks following your surgery. At that time you may use vitamin E to assist with softening of your incision.    IN-HOME PHYSICAL THERAPY   In-home physical therapy will start 1-2 days after you get home from the hospital.    The home care agency will call within the first 24-48 hours to set up their first visit.  Please do not call your care team to inquire during this timeframe.  Continue the exercises you were given in the hospital until you have been seen by in-home therapy.  Make sure to provide a phone number with the ability for the home care staff to leave a message if you do not answer your phone.

## 2024-07-24 NOTE — DISCHARGE INSTR - OTHER ORDERS
WOUND CARE  Patient may shower. Place plastic (saran wrap) over dressing   Do not let the water directly hit the dressing. Pat dry when finished. Do not apply creams or lotions.  No tub soaks.  If you experience continued drainage or bleeding, you may cover with abdominal pads (purchase at local drug store).  Knee replacements should wrap with an ace wrap.  Your surgical bandage will be removed by you or your home therapist 1 week after surgery.  You have staples in place, they will be removed by the home care team in 3 weeks. DO NOT shower or get wound wet until the staples have been removed.    DENTAL PROCEDURES & CLEANINGS  You must wait a minimum of 3 months for elective dental appointments, including routine cleanings or dental work including bridges, crowns, extractions, etc..  For any dental visit - cleaning or dental procedures - patients must take an antibiotic 1 hour before the appointment.  Antibiotics are a lifelong need before dental appointments.  The antibiotic prescribed will be based on each patient's allergies.     ICE & COLD THERAPY INSTRUCTIONS       To assist with pain control and post-op swelling, you should be using ice regularly throughout recovery, especially for the first 6 weeks, regardless of the cold therapy method you use.      Always make sure there is a layer of protection between the cold pad and your skin.    If you are using ICE PACKS or GEL PACKS, you will need to alternate 20 minutes on, 20 minutes off twice per hour.    If you are using an ICE MACHINE, please follow the provided ice machine instructions.  These devices differ from ice or ice packs whereas the mechanism circulates water through tubing and a pad to provide longer periods of cold therapy to the desired site.  You can use your cold devices around the clock for optimal comfort.  We recommend using cold therapy after working with therapy or completing exercises on your own.  There is no set schedule in which you must  follow while using cold therapy.  Below are a few points to remember when using a cold therapy device:    You do not need to need to use the 20 on, 20 off method.  Detach the pad from the cooler and ambulate at least once every hour.  You can check your skin under the pad at this time.  You may wear the cold therapy device during periods of sleep including overnight.  If you wake up during the night, you can check the skin at this time.  You do not need to wake up specifically to perform skin checks.  Empty the cooler and pad when device is not in use.  Follow 's instructions for cleaning your cold therapy device.

## 2024-07-24 NOTE — HH CARE COORDINATION
Home Care received a Referral for Physical Therapy. We have processed the referral for a Start of Care on 7/25/2024.     If you have any questions or concerns, please feel free to contact us at [phone]. Follow the prompts, enter your five digit zip code, and you will be directed to your care team on CENTL 1.

## 2024-07-24 NOTE — DISCHARGE INSTRUCTIONS
Ian Montero MD  [address] [address]  [phone]      PLEASE READ CAREFULLY BEFORE CONTACTING YOUR PROVIDER.    WE WORK COLLABORATIVELY AS A TEAM. CALLING MULTIPLE STAFF MEMBERS REGARDING THE SAME ISSUE WILL DELAY YOUR CARE.  MYCHART IS THE PREFERRED COMMUNICATION FOR ALL TEAM MEMBERS.    Postoperative Instructions: TOTAL HIP & TOTAL KNEE ARTHROPLASTY    JOINT CARE TEAM  Please use the information below to contact your care team following surgery.  If you are leaving a message, please include your full name, date of birth and date of surgery so that we can correctly identify you.  Your call will be returned within 1-2 business days, please do not leave multiple messages regarding a single issue while you are awaiting a return call.     Who to call Contact Information Matters needing handled    Elise MARAVILLA, RN   Ortho/Trauma Nurse Navigator    [phone]   Prescription Refills   Medical questions/concerns  Orders for dental antibiotics lifelong  Nursing, medical question related questions or concerns within 6 weeks of surgery   Orders for Outpatient Physical Therapy          [facility]            [phone]     Scheduling office Visits  Leave of Absence or other paperwork  Any concerns more than 6 weeks from surgery - an appointment will need to be made     MEDICATION REFILLS - TAIWO Potts, RN (My Chart or Office Number)    You will not receive a call indicating that your prescription has been filled.  Please contact your pharmacy with any questions.    Medication refills will be filled Monday-Friday 7am to 1pm ONLY. Please call the office or send a [employer] message for a refill request.  Any requests received outside of this timeframe will be handled on the next business day.  Messages should be left directly through the office or via my chart.  Please do not call multiple times or call other members of the care team for medication needs, this will cause the  refill to take longer.    Per State and Institutional policy, pain medications can only be refilled every 7 days for up to six weeks following surgery.    DISCHARGE MEDICATIONS - Please reference the sample schedule on the reverse side for instructions on how to best schedule medications.  PAIN MEDICATION    ___X___ Tramadol / Oxycodone  Tramadol and Oxycodone have been prescribed for post-operative pain control.    These medications will only be refilled ONCE every 7 days for a period of up to 6 weeks following surgery.  After 6 weeks, you will transition to acetaminophen and over -the- counter anti-inflammatories such as Ibuprofen, Advil or Aleve in conjunction with ICE/COLD THERAPY.   Side effects may be constipation and nausea, vomiting, sleepiness, dizziness, lightheadedness, headache, blurred vision, dry mouth sweating, itching (if you have itching, over-the -counter Benadryl can be used as needed).  You may NOT operate a motor vehicle while taking these medications or have been cleared by your care team.     ___X___ Acetaminophen (Tylenol)  Acetaminophen has been prescribed as an adjunct for pain control. Take two 500 mg tablets every 6 hours for 4 weeks. You will not receive a refill on this medication.  Do not exceed 4000mg of acetaminophen within a 24 hour period.  Side effects may include nausea, heartburn, drowsiness, and headache.    _______ Meloxicam (Mobic)-Meloxicam has been prescribed as an adjunct anti-inflammatory to assist in pain control.    Take one 15mg tablet once daily for 4 weeks.  You will not receive refills on this medication.   Side effects may include nausea.  May not be prescribed if you are on a more potent blood thinner than aspirin or have chronic kidney disease.    BLOOD THINNER    ___X___Blood Thinner   A blood thinner has been prescribed to prevent blood clots in your leg or lungs. Take as prescribed on the bottle for 4 weeks. You will not receive a refill on this  medication.    ANTI NAUSEA      ______ Pantoprazole (Protonix)  Pantoprazole has been prescribed to help with nausea and protect your stomach while taking pain medication. . You will not receive a refill on this medication.    ______ Zofran   Zofran has been prescribed to help with nausea and protect your stomach while taking pain medication. Take one 4 mg tablet every eight hours as needed for nausea. Refills will be provided as necessary.    STOOL SOFTENERS    ___X___Colace (Docusate Sodium) & Miralax (polyethylene glycol)  Take both medications to help with constipation while using the Oxycodone and Tramadol for pain control.  You will not receive a refill on this medication.    ______ Senna  Senna has been prescribed to help with constipation while on Oxycodone and Tramadol. Take one tab, once daily. Senna is a laxative used to treat constipation.  Side effects may include nausea, vomiting, persistent diarrhea, abdominal cramps, and discolored urine.      You will not receive refills on the following medications:  Acetaminophen (Tylenol)  Meloxicam  Miralax  Colace  Pantoprazole  Blood Thinner    Pain Medication Refills [phone] or MyChart- Monday through Friday 7am-1pm    Medication refills will be sent upon receipt of your request during the times listed above. Due to the high call volume, you will not receive a call confirming prescription refills; please do not call multiple times.  Prescription refills may take a few hours to process, you may follow up with your pharmacy for pickup availability.    SAMPLE              The times below are an example of how to organize medications to optimize pain control        Time 3:00 am 6:00 am 9:00 am 12:00 pm 3:00 pm 6:00 pm 9:00 pm 12:00 am   Medications Tramadol Tylenol  Oxycodone  Miralax   Blood Thinner  Colace  Pantoprazole  Tramadol  Meloxicam Tylenol  Oxycodone Tramadol Tylenol  Oxycodone  Miralax Blood Thinner  Colace  Tramadol   Tylenol  Oxycodone          You may begin to wean off the pain medication as your pain remains controlled with increased activity.  The schedules provided are meant to serve as an example.  You may wean off based on your pain control.  Please note that pain medications are not filled beyond 6 weeks after surgery.              The times below are an example of how to WEAN OFF medications WHILE CONTINUING TO OPTIMIZE PAIN CONTROL.  Your actual medication schedule may vary based on your last dose taken.    Time 12:00am 4:00am 8:00am 12:00pm 4:00pm 8:00pm   Med Tramadol Oxycodone   Tramadol Oxycodone Tramadol Oxycodone     Time 12:00am 6:00am 12:00pm 6:00pm   Med Tramadol Oxycodone   Tramadol Oxycodone     Time 12:00am 8:00am 4:00pm   Med Tramadol Oxycodone   Tramadol     Time 12:00am 12:00pm   Med Tramadol Tramadol

## 2024-07-24 NOTE — BRIEF OP NOTE
Date: 2024  OR Location: [facility] OR    Name: Megan Wolf, : 1960, Age: 63 y.o., MRN: [id number], Sex: female    Diagnosis  Pre-op Diagnosis      * Unilateral primary osteoarthritis, right knee [M17.11] Post-op Diagnosis     * Unilateral primary osteoarthritis, right knee [M17.11]     Procedures  Knee Replacement Total Cement Unilat  13067 - [place] ARTHRP KNE CONDYLE&PLATU MEDIAL&LAT COMPARTMENTS      Surgeons      * Ian Montero - Primary    Resident/Fellow/Other Assistant:  Surgeons and Role:     * Petros Huggins MD - Resident - Assisting    Procedure Summary  Anesthesia: Spinal  ASA: II  Anesthesia Staff: Anesthesiologist: Yeison Carpenter MD; Akin Bonds MD  CRNA: RAVEN Morales-CRNA  Estimated Blood Loss: 20mL  Intra-op Medications:   Administrations occurring from 0815 to 1130 on 24:   Medication Name Total Dose   sodium chloride 0.9 % irrigation solution 4,000 mL   bupivacaine PF (Marcaine) 0.25 % (2.5 mg/mL) 30 mL, ketorolac (Toradol) 30 mg in sodium chloride 0.9% 30 mL syringe 43 mL              Anesthesia Record               Intraprocedure I/O Totals          Intake    Tranexamic Acid 0.00 mL    The total shown is the total volume documented since Anesthesia Start was filed.    Propofol Drip 0.00 mL    The total shown is the total volume documented since Anesthesia Start was filed.    lactated Ringer's 1010.00 mL    Total Intake 1010 mL       Output    Est. Blood Loss 50 mL    Total Output 50 mL       Net    Net Volume 960 mL          Specimen: No specimens collected     Staff:   Circulator: Remedios Samano Person: Lorna          Findings: see operative report    Complications:  None; patient tolerated the procedure well.     Disposition: PACU - hemodynamically stable.  Condition: stable  Specimens Collected: No specimens collected

## 2024-07-24 NOTE — PROGRESS NOTES
"Orthopaedic Surgery Progress Note    S:  Evaluated immediately postoperatively in PACU. Pain well controlled. Denies chest pain, shortness of breath, or fevers.    O:  /63   Pulse 83   Temp 35.9 °C (96.6 °F) (Skin)   Resp 14   Ht 1.676 m (5' 5.98\")   Wt 78 kg (171 lb 15.3 oz)   SpO2 100%   BMI 27.77 kg/m²     Gen: arousable, NAD, appropriately conversational  Cardiac: extremities warm  Resp: nonlabored on RA  GI: soft, nondistended    MSK:  Right Lower Extremity:   -Dressing clean, dry, and intact, no strikethrough.  -Fires DF/PF/EHL/FHL  -SILT in saph/sural/SPN/DPN distributions  -Foot warm, well perfused  -Palpable DP pulse, brisk cap refill  -Compartments soft and compressible      A/P: 63 y.o. female s/p R TKA on 7/24 with Dr. Montero.      Plan:  - Weight bearing: WBAT RLE  - DVT ppx: SCDs, ASA 81 bid  - Diet: Regular  - Pain: Tylenol, oxycodone 5/10  - Antibiotics: perioperative ancef 2g q8hr x3 doses  - FEN: HLIV with good PO intake  - Bowel Regimen: Colace, senna, dulcolax  - PT/OT  - Pulm: Encourage IS  - Continue home medications  - No hogue    Dispo: Pending PT, anticipate same day discharge    Petros Huggins MD  PGY-3 Orthopedic Surgery  [facility]  [employer] Chat Preferred  Pager: [phone]    While inpatient, this patient will be followed by the Orthopaedic Foot and Ankle team. Please see contact information below:    While admitted, this patient will be followed by the Ortho Foot and Ankle Team.  Please contact below resident with any questions (Available via Epic Chat).    Petros Huggins, PGY-3     6pm-6am M-F, Holidays, and weekends page Ortho on-call [phone] with urgent questions/concerns.          "

## 2024-07-24 NOTE — ANESTHESIA PREPROCEDURE EVALUATION
Patient: Megan Wolf    Procedure Information       Date/Time: 07/24/24 0815    Procedure: Knee Replacement Total Cement Unilat (Right: Knee)    Location: [facility] OR 08 / [facility]    Surgeons: Ian Montero MD            Relevant Problems   Cardiac   (+) Benign essential HTN   (+) Systolic murmur      Hematology   (+) Anemia      Musculoskeletal   (+) Primary osteoarthritis of left knee   (+) Primary osteoarthritis of right knee   (+) Unilateral primary osteoarthritis, right knee      GYN   (+) Uterine leiomyoma       Clinical information reviewed:   Tobacco  Allergies  Meds  Problems  Med Hx  Surg Hx  OB Status    Fam Hx  Soc Hx        NPO Detail:  NPO/Void Status  Carbohydrate Drink Given Prior to Surgery? : N  Date of Last Liquid: 07/23/24  Time of Last Liquid: 0000  Date of Last Solid: 07/23/24  Time of Last Solid: 0000  Last Intake Type: Clear fluids  Time of Last Void: 0608         Physical Exam    Airway  Mallampati: I  TM distance: >3 FB  Neck ROM: full     Cardiovascular    Dental    Pulmonary    Abdominal            Anesthesia Plan    History of general anesthesia?: no  History of complications of general anesthesia?: unknown/emergency    ASA 2     spinal and regional     The patient is not a current smoker.  Patient did not smoke on day of procedure.    Postoperative administration of opioids is intended.  Anesthetic plan and risks discussed with patient and mother.  Use of blood products discussed with patient and mother who consented to blood products.    Plan spinal anesthesia with peripheral IV placement and ASA standard monitors.  The possibility of blood product transfusion was also described in detail.  Risks, benefits, alternatives of this plan were described in detail to the patient, who indicated understanding and agreed to proceed.    Yeison Carpenter MD

## 2024-07-24 NOTE — PROGRESS NOTES
Physical Therapy                 Therapy Communication Note    Patient Name: Megan Wolf  MRN: [id number]  Today's Date: 7/24/2024     Discipline: Physical Therapy    Missed Visit Reason: Missed Visit Reason: Patient placed on medical hold (Pt's R LE assessed by PT with no visible contraction seen in R quad and minimal movement in ankle DF/PF R LE. Pt reporting numbness still in R LE. Will hold PT eval at this time.)    Missed Time: Attempt

## 2024-07-25 ENCOUNTER — PHARMACY VISIT (OUTPATIENT)
Dept: PHARMACY | Facility: CLINIC | Age: 64
End: 2024-07-25
Payer: COMMERCIAL

## 2024-07-25 VITALS
DIASTOLIC BLOOD PRESSURE: 56 MMHG | BODY MASS INDEX: 27.64 KG/M2 | TEMPERATURE: 96.4 F | OXYGEN SATURATION: 96 % | RESPIRATION RATE: 16 BRPM | SYSTOLIC BLOOD PRESSURE: 93 MMHG | HEART RATE: 82 BPM | WEIGHT: 171.96 LBS | HEIGHT: 66 IN

## 2024-07-25 LAB
ANION GAP SERPL CALC-SCNC: 12 MMOL/L (ref 10–20)
BUN SERPL-MCNC: 15 MG/DL (ref 6–23)
CALCIUM SERPL-MCNC: 9.2 MG/DL (ref 8.6–10.6)
CHLORIDE SERPL-SCNC: 101 MMOL/L (ref 98–107)
CO2 SERPL-SCNC: 27 MMOL/L (ref 21–32)
CREAT SERPL-MCNC: 0.68 MG/DL (ref 0.5–1.05)
EGFRCR SERPLBLD CKD-EPI 2021: >90 ML/MIN/1.73M*2
ERYTHROCYTE [DISTWIDTH] IN BLOOD BY AUTOMATED COUNT: 11.2 % (ref 11.5–14.5)
GLUCOSE SERPL-MCNC: 95 MG/DL (ref 74–99)
HCT VFR BLD AUTO: 25.6 % (ref 36–46)
HGB BLD-MCNC: 9 G/DL (ref 12–16)
MCH RBC QN AUTO: 31.7 PG (ref 26–34)
MCHC RBC AUTO-ENTMCNC: 35.2 G/DL (ref 32–36)
MCV RBC AUTO: 90 FL (ref 80–100)
NRBC BLD-RTO: 0 /100 WBCS (ref 0–0)
PLATELET # BLD AUTO: 166 X10*3/UL (ref 150–450)
POTASSIUM SERPL-SCNC: 3.6 MMOL/L (ref 3.5–5.3)
RBC # BLD AUTO: 2.84 X10*6/UL (ref 4–5.2)
SODIUM SERPL-SCNC: 136 MMOL/L (ref 136–145)
WBC # BLD AUTO: 4.9 X10*3/UL (ref 4.4–11.3)

## 2024-07-25 PROCEDURE — 97535 SELF CARE MNGMENT TRAINING: CPT | Mod: GO

## 2024-07-25 PROCEDURE — 97116 GAIT TRAINING THERAPY: CPT | Mod: GP | Performed by: PHYSICAL THERAPIST

## 2024-07-25 PROCEDURE — 99231 SBSQ HOSP IP/OBS SF/LOW 25: CPT

## 2024-07-25 PROCEDURE — 97165 OT EVAL LOW COMPLEX 30 MIN: CPT | Mod: GO

## 2024-07-25 PROCEDURE — 2500000004 HC RX 250 GENERAL PHARMACY W/ HCPCS (ALT 636 FOR OP/ED): Mod: JZ

## 2024-07-25 PROCEDURE — 36415 COLL VENOUS BLD VENIPUNCTURE: CPT

## 2024-07-25 PROCEDURE — 82374 ASSAY BLOOD CARBON DIOXIDE: CPT

## 2024-07-25 PROCEDURE — 85027 COMPLETE CBC AUTOMATED: CPT

## 2024-07-25 PROCEDURE — 97161 PT EVAL LOW COMPLEX 20 MIN: CPT | Mod: GP | Performed by: PHYSICAL THERAPIST

## 2024-07-25 PROCEDURE — 2500000001 HC RX 250 WO HCPCS SELF ADMINISTERED DRUGS (ALT 637 FOR MEDICARE OP)

## 2024-07-25 ASSESSMENT — COGNITIVE AND FUNCTIONAL STATUS - GENERAL
STANDING UP FROM CHAIR USING ARMS: A LITTLE
WALKING IN HOSPITAL ROOM: A LITTLE
CLIMB 3 TO 5 STEPS WITH RAILING: A LOT
DAILY ACTIVITIY SCORE: 20
TOILETING: A LITTLE
MOVING TO AND FROM BED TO CHAIR: A LITTLE
PERSONAL GROOMING: A LITTLE
DRESSING REGULAR LOWER BODY CLOTHING: A LITTLE
MOBILITY SCORE: 19
HELP NEEDED FOR BATHING: A LITTLE

## 2024-07-25 ASSESSMENT — ACTIVITIES OF DAILY LIVING (ADL)
ADL_ASSISTANCE: INDEPENDENT
HOME_MANAGEMENT_TIME_ENTRY: 9
BATHING_ASSISTANCE: MINIMAL

## 2024-07-25 ASSESSMENT — PAIN SCALES - GENERAL
PAINLEVEL_OUTOF10: 7
PAINLEVEL_OUTOF10: 10 - WORST POSSIBLE PAIN
PAINLEVEL_OUTOF10: 8
PAINLEVEL_OUTOF10: 4
PAINLEVEL_OUTOF10: 7
PAINLEVEL_OUTOF10: 7

## 2024-07-25 ASSESSMENT — PAIN - FUNCTIONAL ASSESSMENT
PAIN_FUNCTIONAL_ASSESSMENT: 0-10

## 2024-07-25 NOTE — PROGRESS NOTES
Pharmacy Admission Order Reconciliation Review    Megan Wolf is a 63 y.o. female admitted for Unilateral primary osteoarthritis, right knee. Pharmacy reviewed the patient's unreconciled admission medications.    Prior to admission medications that were reviewed and acted on by the pharmacist include:  Vitamin D2  Vitamin B12  Naproxen  Prednisone  These medications have been reconciled.     Any other unreconcilied medications have been addressed and will be ordered or held by the patient's medical team. Medications addressed by the pharmacist may be added or changed by the patient's medical team at any time.    Janel Malik, PharmD  Transitions of Care Pharmacist  [facility]  Please reach out via Secure Chat for questions

## 2024-07-25 NOTE — PROGRESS NOTES
Postop Pain HPI -   Palliative: relieved with IV analgesics and regional local anesthetics  Provocative: movement  Quality:  burning and aching  Radiation:  none  Severity:  7/10  Timing: constant    24-HOUR OPIOID CONSUMPTION:  Hydromorphone 0.5mg  Oxycodone 10mg    Scheduled medications  acetaminophen, 650 mg, oral, q6h MAEGAN  aspirin, 81 mg, oral, BID  calcium carbonate-vitamin D3, 1 tablet, oral, BID  polyethylene glycol, 17 g, oral, Daily  sennosides-docusate sodium, 2 tablet, oral, BID      Continuous medications  lactated Ringer's, 100 mL/hr, Last Rate: 100 mL/hr (07/25/24 1008)  oxygen, 2 L/min, Last Rate: Stopped (07/24/24 1745)      PRN medications  PRN medications: bisacodyl, cyclobenzaprine, diphenhydrAMINE, HYDROmorphone, magnesium hydroxide, naloxone, naloxone, ondansetron **OR** ondansetron, oxyCODONE, oxyCODONE, prochlorperazine **OR** prochlorperazine **OR** prochlorperazine     Physical Exam:  Constitutional:  no distress, alert and cooperative  Eyes: clear sclera  Head/Neck: No apparent injury, trachea midline  Respiratory/Thorax: Patent airways, thorax symmetric, breathing comfortably  Cardiovascular: no pitting edema  Gastrointestinal: Nondistended  Musculoskeletal: ROM intact  Extremities: no clubbing  Neurological: alert, durham x4  Psychological: Appropriate affect    Results for orders placed or performed during the hospital encounter of 07/24/24 (from the past 24 hour(s))   CBC   Result Value Ref Range    WBC 4.9 4.4 - 11.3 x10*3/uL    nRBC 0.0 0.0 - 0.0 /100 WBCs    RBC 2.84 (L) 4.00 - 5.20 x10*6/uL    Hemoglobin 9.0 (L) 12.0 - 16.0 g/dL    Hematocrit 25.6 (L) 36.0 - 46.0 %    MCV 90 80 - 100 fL    MCH 31.7 26.0 - 34.0 pg    MCHC 35.2 32.0 - 36.0 g/dL    RDW 11.2 (L) 11.5 - 14.5 %    Platelets 166 150 - 450 x10*3/uL   Basic metabolic panel   Result Value Ref Range    Glucose 95 74 - 99 mg/dL    Sodium 136 136 - 145 mmol/L    Potassium 3.6 3.5 - 5.3 mmol/L    Chloride 101 98 - 107 mmol/L     Bicarbonate 27 21 - 32 mmol/L    Anion Gap 12 10 - 20 mmol/L    Urea Nitrogen 15 6 - 23 mg/dL    Creatinine 0.68 0.50 - 1.05 mg/dL    eGFR >90 >60 mL/min/1.73m*2    Calcium 9.2 8.6 - 10.6 mg/dL        PLAN  -R AC single shot nerve blocks performed preoperatively 07/24/2024  -Rest of pain medications per primary service  -Acute pain service to sign off at this time    Acute Pain Service Resident  pg [phone] ph [phone]

## 2024-07-25 NOTE — PROGRESS NOTES
"Orthopaedic Surgery Progress Note    S:  NAEO. Pain well controlled. Denies chest pain, shortness of breath, or fevers.    O:  /64   Pulse 82   Temp 36.4 °C (97.5 °F) (Temporal)   Resp 14   Ht 1.676 m (5' 5.98\")   Wt 78 kg (171 lb 15.3 oz)   SpO2 97%   BMI 27.77 kg/m²     Gen: arousable, NAD, appropriately conversational  Cardiac: extremities warm  Resp: nonlabored on RA  GI: soft, nondistended    MSK:  Right Lower Extremity:   -Dressing clean, dry, and intact, no strikethrough.  -Fires DF/PF/EHL/FHL  -SILT in saph/sural/SPN/DPN distributions  -Foot warm, well perfused  -Palpable DP pulse, brisk cap refill  -Compartments soft and compressible      A/P: 63 y.o. female s/p R TKA on 7/24 with Dr. Montero.      Plan:  - Weight bearing: WBAT RLE  - DVT ppx: SCDs, ASA 81 bid  - Diet: Regular  - Pain: Tylenol, oxycodone 5/10  - Antibiotics: perioperative ancef 2g q8hr x3 doses  - FEN: HLIV with good PO intake  - Bowel Regimen: Colace, senna, dulcolax  - PT/OT  - Pulm: Encourage IS  - Continue home medications  - No hogue    Dispo: Home with home care today    Petros Huggins MD  PGY-3 Orthopedic Surgery  [facility]  [employer] Chat Preferred  Pager: [phone]    While inpatient, this patient will be followed by the Orthopaedic Foot and Ankle team. Please see contact information below:    While admitted, this patient will be followed by the Ortho Foot and Ankle Team.  Please contact below resident with any questions (Available via Epic Chat).    Petros Huggins, PGY-3     6pm-6am M-F, Holidays, and weekends page Ortho on-call [phone] with urgent questions/concerns.          "

## 2024-07-25 NOTE — DISCHARGE SUMMARY
Discharge Diagnosis  Unilateral primary osteoarthritis, right knee    Issues Requiring Follow-Up  The patient should return to see Dr. Montero's office 6 weeks postop.  She should keep the dressing on for 1 week and the staples should be removed at 3 weeks postop by the physical therapist or home nurse.  The patient should not get the wound wet for 3 weeks until the staples are removed.    Test Results Pending At Discharge  Pending Labs       No current pending labs.            Hospital Course   Patient had a benign postoperative course.    Pertinent Physical Exam At Time of Discharge  Physical Exam the knee wound dressing was intact clean and dry.    Home Medications     Medication List      START taking these medications     aspirin 81 mg EC tablet; Take 1 tablet (81 mg) by mouth 2 times a day.   docusate sodium 100 mg capsule; Commonly known as: Colace; Take 1   capsule (100 mg) by mouth 2 times a day.   oxyCODONE-acetaminophen 5-325 mg tablet; Commonly known as: Percocet;   Take 1 tablet by mouth every 6 hours if needed for severe pain (7 - 10)   for up to 28 doses.     CONTINUE taking these medications     lisinopriL-hydrochlorothiazide 20-25 mg tablet; Take 1 tablet by mouth   once daily.   potassium chloride CR 20 mEq ER tablet; Commonly known as: Klor-Con M20;   Take 1 tablet (20 mEq) by mouth once daily. Do not crush or chew.   Vitamin D2 1,250 mcg (50,000 unit) capsule; Generic drug:   ergocalciferol; Take 1 capsule (50,000 Units) by mouth 1 (one) time per   week.     ASK your doctor about these medications     naproxen 500 mg tablet; Commonly known as: Naprosyn; Take 1 tablet (500   mg) by mouth 2 times a day as needed for mild pain (1 - 3).   predniSONE 5 mg tablet; Commonly known as: Deltasone; Take 5 tabs (25   mg) by mouth daily for 2 days, then 4 tabs (20 mg) daily for 2 days, then   3 tabs (15 mg) daily for 2 days, then 2 tabs (10 mg) daily for 2 days,   then 1 tab (5 mg) daily for 2 days.        Outpatient Follow-Up  Future Appointments   Date Time Provider Department Center   7/26/2024  7:00 AM Yasmany Diaz PT [facility]   9/9/2024 11:00 AM Ian Montero MD [id number] Papi Montero MD

## 2024-07-25 NOTE — PROGRESS NOTES
Occupational Therapy    Evaluation and Treatment    Patient Name: Megan Wolf  MRN: [id number]  Today's Date: 7/25/2024  Room: [address] [place], [place] [address]  Time Calculation  Start Time: 1204  Stop Time: 1228  Time Calculation (min): 24 min    Assessment  IP OT Assessment  OT Assessment: Pt demo's decreased occupational performance secondary to deficits with functional mobility and balance. Pt demo'd ability to complete sponge bathing and dressing at EOB with good static and dynamic seated balance. Pt demo'd ability to stand to pull pants over hips with CGA for balance. Pt would benefit from skilled OT to improve functional mobility, teach compensatory tehcniques and improve safety nad balance  Prognosis: Good  Barriers to Discharge: None  Evaluation/Treatment Tolerance: Patient tolerated treatment well  Medical Staff Made Aware: Yes  End of Session Communication: Bedside nurse, Care Coordinator, Physician  End of Session Patient Position: Bed, 2 rail up, Alarm off, not on at start of session  Plan:  Inpatient Plan  Treatment Interventions: ADL retraining, Functional transfer training, Patient/family training, Compensatory technique education, Equipment evaluation/education  OT Frequency: 2 times per week  OT Discharge Recommendations: Low intensity level of continued care  OT Recommended Transfer Status: Stand by assist  OT - OK to Discharge: Yes  OT Assessment  OT Assessment Results: Decreased ADL status, Decreased functional mobility, Decreased IADLs  Prognosis: Good  Barriers to Discharge: None  Evaluation/Treatment Tolerance: Patient tolerated treatment well  Medical Staff Made Aware: Yes  Strengths: Attitude of self, Premorbid level of function  Barriers to Participation:  (none)    Subjective   Current Problem:  1. Primary osteoarthritis of left knee        2. Postoperative pain  oxyCODONE-acetaminophen (Percocet) 5-325 mg tablet      3. Unilateral primary osteoarthritis, right knee  Discharge patient    docusate sodium  (Colace) 100 mg capsule    aspirin 81 mg EC tablet    Referral to Home Health    Discharge patient        General:  Reason for Referral: Presented with severe knee pain, Dx: Unilateral primary osteoarthritis, right knee, S/P: Knee Replacement Total Cement Unilat 7/24  Past Medical History Relevant to Rehab: osteoarthritis, CHF, HTN, vitamin B12 deficiency  Prior to Session Communication: Bedside nurse  Patient Position Received: Bed, 2 rail up, Alarm off, not on at start of session  Family/Caregiver Present: Yes (mother and friend)  Caregiver Feedback: engaged and pleasant  General Comment: pt supine in bed upon arrival and agreeable to OT eval   Precautions:  LE Weight Bearing Status: Weight Bearing as Tolerated (RLE)  Medical Precautions: Fall precautions  Vital Signs:     Pain:  Pain Assessment  Pain Assessment: 0-10  0-10 (Numeric) Pain Score: 7  Pain Type: Acute pain  Pain Interventions: Repositioned  Response to Interventions: best at rest  Lines/Tubes/Drains:         Objective   Cognition:  Overall Cognitive Status: Within Functional Limits  Orientation Level: Oriented X4           Home Living:  Type of Home: House  Lives With: Parent(s)  Home Adaptive Equipment: Walker rolling or standard  Home Layout: One level  Home Access: Stairs to enter with rails  Entrance Stairs-Rails: Both  Entrance Stairs-Number of Steps: 3  Bathroom Shower/Tub: Tub/shower unit  Bathroom Toilet: Standard  Bathroom Equipment: Shower chair with back  Home Living Comments: home is accessible with ability to say on first floor as needed   Prior Function:  Level of [place]: Independent with ADLs and functional transfers, Independent with homemaking with ambulation  Receives Help From: Family  ADL Assistance: Independent  Homemaking Assistance: Independent  Ambulatory Assistance: Independent (with FWW)  Vocational: On disability (until Oct)  Leisure: watching sports  Hand Dominance: Right  Prior Function Comments: pt states she was  IND prior to admittance with help from family as needed  IADL History:  Homemaking Responsibilities: Yes  Meal Prep Responsibility: Primary  Laundry Responsibility: Primary  Cleaning Responsibility: Primary  Bill Paying/Finance Responsibility: Primary  Shopping Responsibility: Primary  Homemaking Comments: IND prior  Current License: No  Occupation: On disability  Type of Occupation: environmental services  Leisure and Hobbies: watching sports  IADL Comments: IND prior  ADL:  Eating Assistance: Independent (anticipated)  Grooming Assistance: Stand by (anticipated)  Bathing Assistance: Minimal (EOB sponge bath)  Bathing Deficit: Right lower leg including foot, Use of adaptive equipment  UE Dressing Assistance: Independent  LE Dressing Assistance: Minimal  LE Dressing Deficit: Use of adaptive equipment, Thread RLE into pants, Don/doff R sock  Toileting Assistance with Device: Stand by (anticipated)  ADL Comments: pt demo ability to don gown IND, required min A for socks and pants d/t need for assistance with threading RLE. Pt sponge bathed while seated EOB with good balance and safety.  Activity Tolerance:  Endurance: Tolerates 10 - 20 min exercise with multiple rests  Balance:  Dynamic Sitting Balance  Dynamic Sitting-Balance Support: Feet supported  Dynamic Sitting-Balance: Forward lean, Lateral lean  Dynamic Sitting-Comments: SUP with good balance and safety  Dynamic Standing Balance  Dynamic Standing-Balance Support: Bilateral upper extremity supported  Dynamic Standing-Balance: Forward lean, Lateral lean  Dynamic Standing-Comments: stand to pull pants over hips with CGA with no LOB and cueing for positioning and keeping one hand on walker for support.  Bed Mobility/Transfers: Bed Mobility/Transfers: Bed Mobility  Bed Mobility: Yes  Bed Mobility 1  Bed Mobility 1: Supine to sitting  Level of Assistance 1: Close supervision  Bed Mobility Comments 1: HOB slightly elevated.  Bed Mobility 2  Bed Mobility  2: Sitting  to supine  Level of Assistance 2: Close supervision  Bed Mobility Comments 2: HOB slightly elevated. cueing for positioning to improve bed positioning  Functional Mobility  Functional Mobility Performed: Yes  Functional Mobility 1  Surface 1: Level tile  Device 1: Rolling walker  Assistance 1: Contact guard  Comments 1: functional mobility from EOB to door with CGA. Pt provided cueing for upright posture, walker mobility, and  positioning to improve balance and safety with functional mobility.   and Transfers  Transfer: Yes  Transfer 1  Transfer From 1: Sit to, Stand to  Transfer to 1: Stand, Sit  Technique 1: Sit to stand  Transfer Device 1: Walker  Transfer Level of Assistance 1: Minimum assistance  Trials/Comments 1: x2 trials with min A and min verbal cueing for hand positioning and RLE placement to improve balance and IND  IADL's:   Homemaking Responsibilities: Yes  Meal Prep Responsibility: Primary  Laundry Responsibility: Primary  Cleaning Responsibility: Primary  Bill Paying/Finance Responsibility: Primary  Shopping Responsibility: Primary  Homemaking Comments: IND prior  Current License: No  Occupation: On disability  Type of Occupation: environmental services  Leisure and Hobbies: watching sports  IADL Comments: IND prior  Vision: Vision - Basic Assessment  Current Vision: No visual deficits   and    Sensation:  Light Touch: No apparent deficits  Strength:  Strength Comments: BUEs grossly WFL  Perception:  Inattention/Neglect: Appears intact  Coordination:  Movements are Fluid and Coordinated: Yes   Hand Function:  Hand Function  Gross Grasp: Functional  Coordination: Functional  Extremities:   RUE   RUE : Within Functional Limits, LUE   LUE: Within Functional Limits,  , and        Outcome Measures: [facility] Daily Activity  Putting on and taking off regular lower body clothing: A little  Bathing (including washing, rinsing, drying): A little  Putting on and taking off regular upper body clothing:  None  Toileting, which includes using toilet, bedpan or urinal: A little  Taking care of personal grooming such as brushing teeth: A little  Eating Meals: None  Daily Activity - Total Score: 20         ,     OT Adult Other Outcome Measures  4AT: 4 AT -    Education Documentation  Body Mechanics, taught by DANIEL Knapp at 7/25/2024  1:50 PM.  Learner: Patient  Readiness: Acceptance  Method: Explanation, Demonstration  Response: Verbalizes Understanding, Demonstrated Understanding    Precautions, taught by DANIEL Knapp at 7/25/2024  1:50 PM.  Learner: Patient  Readiness: Acceptance  Method: Explanation, Demonstration  Response: Verbalizes Understanding, Demonstrated Understanding    ADL Training, taught by DANIEL Knapp at 7/25/2024  1:50 PM.  Learner: Patient  Readiness: Acceptance  Method: Explanation, Demonstration  Response: Verbalizes Understanding, Demonstrated Understanding    Education Comments  No comments found.        Goals:   Encounter Problems       Encounter Problems (Active)       ADLs       Patient with complete lower body dressing with independent level of assistance donning and doffing all LE clothes  with PRN adaptive equipment while edge of bed  and standing (Progressing)       Start:  07/25/24    Expected End:  08/15/24            Patient will complete daily grooming tasks with independent level of assistance and PRN adaptive equipment while standing. (Progressing)       Start:  07/25/24    Expected End:  08/15/24            Patient will complete toileting including hygiene clothing management/hygiene with independent level of assistance and grab bars. (Progressing)       Start:  07/25/24    Expected End:  08/15/24               BALANCE       Pt will maintain dynamic standing balance during ADL task with independent level of assistance in order to demonstrate decreased risk of falling and improved postural control. (Progressing)       Start:  07/25/24    Expected End:  08/15/24                MOBILITY       Patient will perform Functional mobility max Household distances/Community Distances with independent level of assistance and least restrictive device in order to improve safety and functional mobility. (Progressing)       Start:  24    Expected End:  08/15/24               TRANSFERS       Patient will complete sit to stand transfer with independent level of assistance and least restrictive device in order to improve safety and prepare for out of bed mobility. (Progressing)       Start:  24    Expected End:  08/15/24                   Treatment Completed on Evaluation  Cognitive Skill Development:       Activities of Daily Living:       UE Bathing  UE Bathing Level of Assistance: Close supervision  UE Bathing Where Assessed: Edge of bed  UE Bathing Comments: pt dynamcally sat EOB with SUP to complete sponge bathing. Pt demo'd good seated balance and was provided cueing for postioning and UE support to improve safety and comfort of RLE.     UE Dressing  UE Dressing Level of Assistance: Independent  UE Dressing Where Assessed: Edge of bed  UE Dressing Comments: donned gown IND while seated EOB. Pt was provided cueing for line management.  LE Dressing  LE Dressing: Yes  LE Dressing Adaptive Equipment: Sock aide, Reacher  Pants Level of Assistance: Close supervision  Sock Level of Assistance: Close supervision  LE Dressing Where Assessed: Edge of bed  LE Dressing Comments: pt donned socks and pants while seated EOB. Pt attempted dressing without AE first with ability to thread  LLE and don/doff L sock without the ability to complete RLE threading and sock dof/don. Pt was provided education and demosntration on the utilization of a reacher and sock aid to mimprove IND and safety. Pt dmeo;d good carryover and cmprehension       IADL's:               Therapy/Activity:                Splintin/25/24 at 3:47 PM   CECILIA STEVENS-OT   Rehab Office: [phone]

## 2024-07-25 NOTE — PROGRESS NOTES
I met with Megan at the bedside regarding discharge planning and home going needs. Patient states that she lives home with her mom [phone] where she is independent with ADL's she does have a walker in the home. Patient is medically cleared for discharge home today with [facility] services referral/orders placed pending processing for SOC. I will continue to follow until discharged. ADDENDUM: Patient is processed for SOC Friday 7/26/24 with [facility] services.

## 2024-07-25 NOTE — PROGRESS NOTES
Physical Therapy    Physical Therapy Evaluation & Treatment    Patient Name: Megan Wolf  MRN: [id number]  Today's Date: 7/25/2024   Time Calculation  Start Time: 0822  Stop Time: 0905  Time Calculation (min): 43 min    Assessment/Plan   PT Assessment  PT Assessment Results: Decreased strength, Decreased range of motion, Decreased endurance, Impaired balance, Decreased mobility, Impaired sensation, Pain, Orthopedic restrictions  Rehab Prognosis: Good  Barriers to Discharge: none noted for PT  Evaluation/Treatment Tolerance: Patient tolerated treatment well  Medical Staff Made Aware: Yes  Strengths: Attitude of self, Ability to acquire knowledge, Access to adaptive/assistive products, Housing layout, Insight into problems, Support of extended family/friends  Barriers to Participation:  (none)  End of Session Communication: Bedside nurse, Care Coordinator, Physician (OT)  Assessment Comment: 64 yo female admitted for secvere knee pain, s/p right TKA presnting with decreased AROM and PROM, strength, decreased mobility, impaired balance and pain. Recommend low intensity rehab with home PT, pt is young and has good support from family, has good home layout, and is motivated.  End of Session Patient Position: Up in chair (alarm off RN aware)   IP OR SWING BED PT PLAN  Inpatient or Swing Bed: Inpatient  PT Plan  PT Plan: Ongoing PT  PT Frequency: BID  PT Discharge Recommendations: Low intensity level of continued care (home PT)  Equipment Recommended upon Discharge: Straight cane  PT Recommended Transfer Status: Assist x1 (min/mod with WW, slightly raised bed height)  PT - OK to Discharge: Yes (PT eval complete and DC recs made)      Subjective     General Visit Information:  General  Reason for Referral: Presented with severe knee pain, Dx: Unilateral primary osteoarthritis, right knee, S/P: Knee Replacement Total Cement Unilat 7/24  Past Medical History Relevant to Rehab: osteoarthritis, CHF, HTN, vitamin B12  deficiency  Missed Visit: No  Family/Caregiver Present: No  Co-Treatment:  (N/A)  Prior to Session Communication: Bedside nurse  Patient Position Received: Bed, 3 rail up, Alarm on  Preferred Learning Style: verbal, kinesthetic  General Comment: Pt is willing to participate in therapy, has increased pain with incresed activity and ambulation in right knee. Walk with very flexed posture, cued for upright posture. Has some difficulty standing from low bed, improves with practice. Stairs needs both hand rails and CGA, very slow and weak on stairs.  Home Living:  Home Living  Type of Home: House  Lives With: Parent(s) (Mom)  Home Adaptive Equipment: Walker rolling or standard (rollator)  Home Layout:  (3 steps to enter from side door with 2 hand rails, arturo bed and full bath on main floor, 2nd floor bed and bath up 6 steps with 1 rail, 6 steps to basement laundry with 1 rail. Pt states he will stay on firt floor upon DC)  Bathroom Shower/Tub: Tub only (first floor bathroom)  Bathroom Toilet: Standard  Bathroom Equipment: Shower chair with back  Home Living Comments: Will stay on first floor  Prior Level of Function:  Prior Function Per Pt/Caregiver Report  Level of [place]:  (ambulation indoors and outdoors with WW, needs use of hand rails on stairs, no falls, doesn't drive, is off work until October)  Receives Help From: Family (mom and 2 sisters that live near by and are over every day)  Hand Dominance: Right  Precautions:  Precautions  LE Weight Bearing Status:  (WBAT RLE)  Medical Precautions: Fall precautions (TKA precautions, fall risk, anemic)  Vital Signs:  Vital Signs  Heart Rate:  (Pre sitting:  HR 93  O2 100%  /67;  Sitting post stairs:  HR 66  O2 99%)    Objective   Pain:  Pain Assessment  Pain Assessment: 0-10  0-10 (Numeric) Pain Score: 10 - Worst possible pain (0/10 pain in bed to start session, pt stated pain to 10/10 with ambulation and incresed WB.)  Pain Interventions: Repositioned, Rest,  Cold applied, Therapeutic presence, Therapeutic touch  Response to Interventions: Pt sitting in chair with foot on stool and ice pack with pillowcase for knee. Pt stated 10/10 pain at end of session, but stated she was comfortable in the chair and was using the ice pack  Cognition:  Cognition  Overall Cognitive Status: Within Functional Limits (A&O x4)  Processing Speed: Within funtional limits    General Assessments:                  Activity Tolerance  Endurance: Tolerates 30 min exercise with multiple rests  Rate of Perceived Dyspnea (RPD): 0 ( 0 to 10 scale) during ambulation  Rate of Perceived Exertion (RPE): 13 (6 to 20 scale) during ambulation    Sensation  Light Touch:  (deficits in right leg, medial portion of knee and lower leg)       Postural Control  Postural Control: Impaired  Posture Comment: in sitting flexed trunk ad rounded shoulders, in standing and walking, extremely flexed trunk and flexed hips, knees flexed         Static Standing Balance  Static Standing-Balance Support: Bilateral upper extremity supported (on WW)  Static Standing-Level of Assistance: Contact guard  Static Standing-Comment/Number of Minutes: very flexed hips and trunk, mildly flexed knees  Dynamic Standing Balance  Dynamic Standing-Balance Support: Bilateral upper extremity supported (on WW)  Dynamic Standing-Balance:  (ambulation with turns)  Dynamic Standing-Comments: very flexed hips and trunk, mildly flexed knees  Functional Assessments:       Bed Mobility  Bed Mobility: Yes  Bed Mobility 1  Bed Mobility 1: Supine to sitting  Level of Assistance 1: Close supervision, Minimal verbal cues    Transfers  Transfer: Yes  Transfer 1  Transfer From 1: Sit to, Stand to  Transfer to 1: Stand, Sit  Technique 1: Sit to stand, Stand to sit  Transfer Device 1: Walker, Gait belt  Transfer Level of Assistance 1: Minimal tactile cues, Minimal verbal cues, Moderate assistance (min/mod assist of 1, improved with repeititons, cue for hand  placement)  Trials/Comments 1: x3  Transfers 2  Transfer From 2: Bed to  Transfer to 2: Chair with arms  Technique 2: Sit to stand, Stand to sit, Stand pivot  Transfer Device 2: Walker  Transfer Level of Assistance 2: Minimum assistance, Minimal verbal cues, Minimal tactile cues (cues for reaching backa nd WW management)  Transfers 3  Transfer From 3: Stand to, Wheelchair to  Transfer to 3: Wheelchair, Stand  Technique 3: Sit to stand, Stand to sit, Stand pivot  Transfer Device 3: Walker  Transfer Level of Assistance 3: Minimum assistance, Minimal verbal cues, Minimal tactile cues    Ambulation/Gait Training  Ambulation/Gait Training Performed: Yes (Focus on WW management, body mechanics and position in the walker, sequencing, upright posture)  Ambulation/Gait Training 1  Surface 1: Level tile  Device 1: Rolling walker  Gait Support Devices: Gait belt  Assistance 1: Contact guard, Moderate verbal cues, Minimal tactile cues (cues for sequencing and body position in walker, cues for upright posture)  Quality of Gait 1:  (flexed trunk hips and knees, WBOS, decreased anderson, step to gait, forward lean on WW,)  Comments/Distance (ft) 1: 20 ft to WC  Ambulation/Gait Training 2  Surface 2: Level tile  Device 2: Rolling walker  Gait Support Devices: Gait belt  Assistance 2: Contact guard, Minimal verbal cues, Minimal tactile cues (cues for sequencing and body position in walker, cues for upright posture)  Quality of Gait 2:  (flexed trunk hips and knees, WBOS, decreased anderson, step to gait, forward lean on WW,)  Comments/Distance (ft) 2: 200 ft    Stairs  Stairs: Yes  Stairs  Rails 1: Bilateral  Device 1: Railing  Support Devices 1: Gait belt  Assistance 1: Minimum assistance, Maximum verbal cues  Comment/Number of Steps 1: 5x1  Extremity/Trunk Assessments:  RUE   RUE : Within Functional Limits  RUE AROM (degrees)  RUE AROM Comment: Grossly WFL shoulder flex, elbow flex/ext  RUE Strength  RUE Overall Strength:  (5/5  strength in shoulder flex, elbow flex/ext, )  LUE   LUE: Within Functional Limits  LUE AROM (degrees)  LUE AROM Comment: Grossly WFL shoulder flex, elbow flex/ext  LUE Strength  LUE Overall Strength:  (5/5 strength in shoulder flex, elbow flex/ext, )  RLE   RLE : Exceptions to WFL  AROM RLE (degrees)  RLE AROM Comment: supine: knee ext -22 degrees, knee flex 49 degrees ; sitting EOB:  knee ext -39 degrees, knee flex 64 degrees;  dorsiflex groissly WFL supine and sitting  Strength RLE  RLE Overall Strength:  (3/5 strength grossly in knee ext, flex in limited range; 3/5 hip flex, dorsi flex)  LLE   LLE : Within Functional Limits  AROM LLE (degrees)  LLE AROM Comment: grossly WFL knee flex, ext, hip flex, dorsiflex  Strength LLE  LLE Overall Strength:  (>3/5 strength in hip flex, knee flex, knee ext, dorsiflex)  Treatments:  Ambulation/Gait Training  Ambulation/Gait Training Performed: Yes (Focus on WW management, body mechanics and position in the walker, sequencing, upright posture)  Ambulation/Gait Training 1  Surface 1: Level tile  Device 1: Rolling walker  Gait Support Devices: Gait belt  Assistance 1: Contact guard, Moderate verbal cues, Minimal tactile cues (cues for sequencing and body position in walker, cues for upright posture)  Quality of Gait 1:  (flexed trunk hips and knees, WBOS, decreased anderson, step to gait, forward lean on WW,)  Comments/Distance (ft) 1: 20 ft to WC  Ambulation/Gait Training 2  Surface 2: Level tile  Device 2: Rolling walker  Gait Support Devices: Gait belt  Assistance 2: Contact guard, Minimal verbal cues, Minimal tactile cues (cues for sequencing and body position in walker, cues for upright posture)  Quality of Gait 2:  (flexed trunk hips and knees, WBOS, decreased anderson, step to gait, forward lean on WW,)  Comments/Distance (ft) 2: 200 ft  Outcome Measures:  [facility] Basic Mobility  Turning from your back to your side while in a flat bed without using bedrails:  None  Moving from lying on your back to sitting on the side of a flat bed without using bedrails: None  Moving to and from bed to chair (including a wheelchair): A little  Standing up from a chair using your arms (e.g. wheelchair or bedside chair): A little  To walk in hospital room: A little  Climbing 3-5 steps with railing: A lot  Basic Mobility - Total Score: 19    Encounter Problems       Encounter Problems (Active)       Bed mobility       Supine to/from sit (HOB flat, no rail) modified independent (Progressing)       Start:  07/25/24               General Goals        Pt will be independent with HEP focus on LE strength and ROM  (Not met)       Start:  07/25/24    Expected End:  08/01/24    Resolved:  07/25/24    Updated to: Pt will be independent with TKR  HEP focus on Right knee strength and ROM    Update reason: update         Pt will be independent with TKR  HEP focus on Right knee strength and ROM (Progressing)       Start:  07/25/24    Expected End:  08/01/24                   Mobility       Pt will transfer sit to and from stand and bed to and from chair with WW, supervision assist, maintaining TKA precautions and no LOB  (Not met)       Start:  07/25/24    Expected End:  08/01/24    Resolved:  07/25/24    Updated to: Pt will transfer sit to and from stand and bed to and from chair with WW WBAT Right LE with supervision assist, maintaining TKA precautions and no LOB    Update reason: update          Pt will ambulate 1000 ft with WW, supervision, maintaining TKA precautions, no LOB, no cues for sequencing  (Not met)       Start:  07/25/24    Expected End:  08/01/24    Resolved:  07/25/24    Updated to: Pt will ambulate 1000 ft with WW WBAT Right LE with supervision while maintaining TKA precautions, no LOB, no cues for sequencing    Update reason: update         Pt will ascend/descend 6 steps with use of 1 rail and cane one step at a time, CGA, no LOB, minimal cues  (Not met)       Start:  07/25/24     Expected End:  08/01/24    Resolved:  07/25/24    Updated to: Pt will ascend/descend 6 steps with use of 1 rail and cane one step at a time with SBA WBAT Right LE, no LOB without cue    Update reason: update         Pt will transfer sit to and from stand and bed to and from chair with WW WBAT Right LE with supervision assist, maintaining TKA precautions and no LOB (Progressing)       Start:  07/25/24    Expected End:  08/01/24                Pt will ascend/descend 6 steps with use of 1 rail and cane one step at a time with SBA WBAT Right LE, no LOB without cue (Progressing)       Start:  07/25/24    Expected End:  08/01/24                Pt will ambulate 1000 ft with WW WBAT Right LE with supervision while maintaining TKA precautions, no LOB, no cues for sequencing (Progressing)       Start:  07/25/24    Expected End:  08/01/24                   Pain - Adult          Strength & ROM       Right LE SLR, SAQ, LAQ >3/5 with no quad lag (Progressing)       Start:  07/25/24    Expected End:  08/01/24            Supine knee ext AROM > -5 degrees, knee flex > 90 degrees; sitting knee ext > -10 degrees, knee flex > 110 degrees  (Progressing)       Start:  07/25/24    Expected End:  08/01/24                   Education Documentation  Handouts, taught by DAVID Dudley at 7/25/2024  9:26 AM.  Learner: Patient  Readiness: Acceptance  Method: Explanation, Demonstration, Handout, Teach-back  Response: Verbalizes Understanding, Demonstrated Understanding  Comment: PT purpose and DC recs. transfer, gait, stair training. WW training and management. TKA precautions, anti embolic exercises with handout.    Precautions, taught by DAVID Dudley at 7/25/2024  9:26 AM.  Learner: Patient  Readiness: Acceptance  Method: Explanation, Demonstration, Handout, Teach-back  Response: Verbalizes Understanding, Demonstrated Understanding  Comment: PT purpose and DC recs. transfer, gait, stair training. WW training and management. TKA  precautions, anti embolic exercises with handout.    Body Mechanics, taught by DAVID Dudley at 7/25/2024  9:26 AM.  Learner: Patient  Readiness: Acceptance  Method: Explanation, Demonstration, Handout, Teach-back  Response: Verbalizes Understanding, Demonstrated Understanding  Comment: PT purpose and DC recs. transfer, gait, stair training. WW training and management. TKA precautions, anti embolic exercises with handout.    Home Exercise Program, taught by DAVID Dudley at 7/25/2024  9:26 AM.  Learner: Patient  Readiness: Acceptance  Method: Explanation, Demonstration, Handout, Teach-back  Response: Verbalizes Understanding, Demonstrated Understanding  Comment: PT purpose and DC recs. transfer, gait, stair training. WW training and management. TKA precautions, anti embolic exercises with handout.    Mobility Training, taught by DAVID Dudley at 7/25/2024  9:26 AM.  Learner: Patient  Readiness: Acceptance  Method: Explanation, Demonstration, Handout, Teach-back  Response: Verbalizes Understanding, Demonstrated Understanding  Comment: PT purpose and DC recs. transfer, gait, stair training. WW training and management. TKA precautions, anti embolic exercises with handout.    Education Comments  No comments found.

## 2024-07-26 ENCOUNTER — HOME CARE VISIT (OUTPATIENT)
Dept: HOME HEALTH SERVICES | Facility: HOME HEALTH | Age: 64
End: 2024-07-26
Payer: COMMERCIAL

## 2024-07-26 VITALS
DIASTOLIC BLOOD PRESSURE: 64 MMHG | RESPIRATION RATE: 14 BRPM | TEMPERATURE: 97.3 F | HEIGHT: 66 IN | SYSTOLIC BLOOD PRESSURE: 112 MMHG | BODY MASS INDEX: 28.28 KG/M2 | HEART RATE: 82 BPM | WEIGHT: 176 LBS

## 2024-07-26 DIAGNOSIS — D50.8 OTHER IRON DEFICIENCY ANEMIA: ICD-10-CM

## 2024-07-26 PROCEDURE — G0151 HHCP-SERV OF PT,EA 15 MIN: HCPCS

## 2024-07-26 RX ORDER — FERROUS SULFATE 325(65) MG
325 TABLET ORAL
Qty: 30 TABLET | Refills: 0 | Status: SHIPPED | OUTPATIENT
Start: 2024-07-26 | End: 2024-08-25

## 2024-07-26 SDOH — HEALTH STABILITY: PHYSICAL HEALTH: EXERCISE TYPE: TKA

## 2024-07-26 SDOH — HEALTH STABILITY: PHYSICAL HEALTH: EXERCISE ACTIVITY: TKA, KNEE FLEX, SAQ, SLR, HS

## 2024-07-26 SDOH — HEALTH STABILITY: PHYSICAL HEALTH: EXERCISE ACTIVITIES SETS: 2

## 2024-07-26 SDOH — HEALTH STABILITY: PHYSICAL HEALTH: PHYSICAL EXERCISE: 15

## 2024-07-26 SDOH — HEALTH STABILITY: PHYSICAL HEALTH: PHYSICAL EXERCISE: SIT, SUPINE

## 2024-07-26 ASSESSMENT — ENCOUNTER SYMPTOMS
HIGHEST PAIN SEVERITY IN PAST 24 HOURS: 6/10
PAIN: 1
PAIN LOCATION - PAIN DURATION: HR
PAIN SEVERITY GOAL: 0/10
PAIN LOCATION - PAIN QUALITY: ACHE
PAIN LOCATION - PAIN SEVERITY: 6/10
PERSON REPORTING PAIN: PATIENT
PAIN LOCATION - PAIN FREQUENCY: INTERMITTENT
HYPERTENSION: 1
PAIN LOCATION: RIGHT KNEE
PAIN LOCATION - EXACERBATING FACTORS: ROM
SUBJECTIVE PAIN PROGRESSION: WAXING AND WANING
LIMITED RANGE OF MOTION: 1
PAIN LOCATION - RELIEVING FACTORS: CP
MUSCLE WEAKNESS: 1
LOWEST PAIN SEVERITY IN PAST 24 HOURS: 2/10

## 2024-07-26 ASSESSMENT — ACTIVITIES OF DAILY LIVING (ADL)
CURRENT_FUNCTION: CONTACT GUARD ASSIST
ENTERING_EXITING_HOME: MINIMUM ASSIST
PHYSICAL TRANSFERS ASSESSED: 1
AMBULATION ASSISTANCE ON FLAT SURFACES: 1
AMBULATION_DISTANCE/DURATION_TOLERATED: 50 FT
OASIS_M1830: 03

## 2024-07-26 NOTE — PROGRESS NOTES
Patients hemoglobin is 9 and hematocrit is 25.6 upon discharge. Ferrous Sulfate 325mg sent to pharmacy on file. Discussed results with patient and mother.

## 2024-07-29 ENCOUNTER — HOME CARE VISIT (OUTPATIENT)
Dept: HOME HEALTH SERVICES | Facility: HOME HEALTH | Age: 64
End: 2024-07-29
Payer: COMMERCIAL

## 2024-07-29 VITALS — RESPIRATION RATE: 16 BRPM | HEART RATE: 102 BPM | OXYGEN SATURATION: 97 %

## 2024-07-29 PROCEDURE — G0151 HHCP-SERV OF PT,EA 15 MIN: HCPCS

## 2024-07-29 SDOH — HEALTH STABILITY: PHYSICAL HEALTH: PHYSICAL EXERCISE: SIT, SUPINE

## 2024-07-29 SDOH — HEALTH STABILITY: PHYSICAL HEALTH: EXERCISE TYPE: TKA

## 2024-07-29 SDOH — HEALTH STABILITY: PHYSICAL HEALTH: PHYSICAL EXERCISE: 15

## 2024-07-29 SDOH — HEALTH STABILITY: PHYSICAL HEALTH: EXERCISE ACTIVITY: OPEN CHAIN

## 2024-07-29 SDOH — HEALTH STABILITY: PHYSICAL HEALTH: EXERCISE ACTIVITIES SETS: 3

## 2024-07-29 ASSESSMENT — ENCOUNTER SYMPTOMS
PAIN LOCATION - PAIN QUALITY: ACHE
PAIN LOCATION - PAIN DURATION: MIN
PAIN LOCATION: RIGHT KNEE
PAIN: 1
LIMITED RANGE OF MOTION: 1
LOWEST PAIN SEVERITY IN PAST 24 HOURS: 1/10
HIGHEST PAIN SEVERITY IN PAST 24 HOURS: 5/10
PAIN LOCATION - EXACERBATING FACTORS: ROM
PAIN SEVERITY GOAL: 0/10
PERSON REPORTING PAIN: PATIENT
MUSCLE WEAKNESS: 1
PAIN LOCATION - PAIN FREQUENCY: INTERMITTENT
PAIN LOCATION - PAIN SEVERITY: 5/10
PAIN LOCATION - RELIEVING FACTORS: CP, MEDS

## 2024-07-29 ASSESSMENT — ACTIVITIES OF DAILY LIVING (ADL)
CURRENT_FUNCTION: INDEPENDENT
PHYSICAL TRANSFERS ASSESSED: 1
AMBULATION ASSISTANCE ON FLAT SURFACES: 1

## 2024-07-31 ENCOUNTER — HOME CARE VISIT (OUTPATIENT)
Dept: HOME HEALTH SERVICES | Facility: HOME HEALTH | Age: 64
End: 2024-07-31
Payer: COMMERCIAL

## 2024-07-31 VITALS — RESPIRATION RATE: 16 BRPM | TEMPERATURE: 98.6 F

## 2024-07-31 PROCEDURE — G0151 HHCP-SERV OF PT,EA 15 MIN: HCPCS

## 2024-07-31 SDOH — HEALTH STABILITY: PHYSICAL HEALTH: EXERCISE ACTIVITIES SETS: 3

## 2024-07-31 SDOH — HEALTH STABILITY: PHYSICAL HEALTH: PHYSICAL EXERCISE: 15

## 2024-07-31 SDOH — HEALTH STABILITY: PHYSICAL HEALTH: EXERCISE TYPE: TKA

## 2024-07-31 SDOH — HEALTH STABILITY: PHYSICAL HEALTH: PHYSICAL EXERCISE: SIT, STAND, SUPINE

## 2024-07-31 SDOH — HEALTH STABILITY: PHYSICAL HEALTH: EXERCISE ACTIVITY: OPEN CHAIN EX

## 2024-07-31 ASSESSMENT — ENCOUNTER SYMPTOMS
PERSON REPORTING PAIN: PATIENT
PAIN LOCATION - RELIEVING FACTORS: CP
MUSCLE WEAKNESS: 1
HIGHEST PAIN SEVERITY IN PAST 24 HOURS: 5/10
SUBJECTIVE PAIN PROGRESSION: GRADUALLY IMPROVING
LOWEST PAIN SEVERITY IN PAST 24 HOURS: 1/10
PAIN LOCATION - PAIN FREQUENCY: FREQUENT
PAIN LOCATION - PAIN DURATION: MIN
PAIN SEVERITY GOAL: 0/10
PAIN: 1
PAIN LOCATION - PAIN QUALITY: ACHE
PAIN LOCATION - PAIN SEVERITY: 5/10
PAIN LOCATION - EXACERBATING FACTORS: ROM
PAIN LOCATION: RIGHT KNEE
LIMITED RANGE OF MOTION: 1

## 2024-07-31 ASSESSMENT — ACTIVITIES OF DAILY LIVING (ADL)
AMBULATION ASSISTANCE ON FLAT SURFACES: 1
AMBULATION_DISTANCE/DURATION_TOLERATED: 50 FT
CURRENT_FUNCTION: INDEPENDENT
PHYSICAL TRANSFERS ASSESSED: 1

## 2024-08-05 ENCOUNTER — HOME CARE VISIT (OUTPATIENT)
Dept: HOME HEALTH SERVICES | Facility: HOME HEALTH | Age: 64
End: 2024-08-05
Payer: COMMERCIAL

## 2024-08-05 VITALS — OXYGEN SATURATION: 97 % | RESPIRATION RATE: 16 BRPM | HEART RATE: 113 BPM

## 2024-08-05 PROCEDURE — G0151 HHCP-SERV OF PT,EA 15 MIN: HCPCS

## 2024-08-05 SDOH — HEALTH STABILITY: PHYSICAL HEALTH: EXERCISE ACTIVITIES SETS: 3

## 2024-08-05 SDOH — HEALTH STABILITY: PHYSICAL HEALTH: PHYSICAL EXERCISE: SIT, STAND, SUPINE

## 2024-08-05 SDOH — HEALTH STABILITY: PHYSICAL HEALTH: EXERCISE TYPE: TKA

## 2024-08-05 SDOH — HEALTH STABILITY: PHYSICAL HEALTH: EXERCISE ACTIVITY: OPEN/ CLOSED CHAIN EX

## 2024-08-05 SDOH — HEALTH STABILITY: PHYSICAL HEALTH: PHYSICAL EXERCISE: 15

## 2024-08-05 ASSESSMENT — ENCOUNTER SYMPTOMS
PAIN LOCATION - EXACERBATING FACTORS: ROM
MUSCLE WEAKNESS: 1
PAIN LOCATION - PAIN DURATION: HR
SUBJECTIVE PAIN PROGRESSION: GRADUALLY IMPROVING
PAIN LOCATION - PAIN SEVERITY: 6/10
PAIN LOCATION - PAIN FREQUENCY: INTERMITTENT
PAIN LOCATION - RELIEVING FACTORS: MEDS, CP
PERSON REPORTING PAIN: PATIENT
LIMITED RANGE OF MOTION: 1
HIGHEST PAIN SEVERITY IN PAST 24 HOURS: 6/10
PAIN SEVERITY GOAL: 0/10
PAIN: 1
PAIN LOCATION - PAIN QUALITY: ACHE
PAIN LOCATION: RIGHT KNEE
LOWEST PAIN SEVERITY IN PAST 24 HOURS: 2/10

## 2024-08-05 ASSESSMENT — ACTIVITIES OF DAILY LIVING (ADL)
CURRENT_FUNCTION: INDEPENDENT
AMBULATION_DISTANCE/DURATION_TOLERATED: 100 FT
PHYSICAL TRANSFERS ASSESSED: 1

## 2024-08-07 ENCOUNTER — HOME CARE VISIT (OUTPATIENT)
Dept: HOME HEALTH SERVICES | Facility: HOME HEALTH | Age: 64
End: 2024-08-07
Payer: COMMERCIAL

## 2024-08-07 VITALS — RESPIRATION RATE: 16 BRPM

## 2024-08-07 PROCEDURE — G0151 HHCP-SERV OF PT,EA 15 MIN: HCPCS

## 2024-08-07 SDOH — HEALTH STABILITY: PHYSICAL HEALTH: PHYSICAL EXERCISE: SIT, SUPINE, STAND

## 2024-08-07 SDOH — HEALTH STABILITY: PHYSICAL HEALTH: PHYSICAL EXERCISE: 15

## 2024-08-07 SDOH — HEALTH STABILITY: PHYSICAL HEALTH: EXERCISE TYPE: TKA

## 2024-08-07 SDOH — HEALTH STABILITY: PHYSICAL HEALTH: EXERCISE ACTIVITY: OPEN/ CLOSED CHAIN

## 2024-08-07 ASSESSMENT — ACTIVITIES OF DAILY LIVING (ADL)
AMBULATION ASSISTANCE ON FLAT SURFACES: 1
PHYSICAL TRANSFERS ASSESSED: 1
AMBULATION_DISTANCE/DURATION_TOLERATED: 80 FT
CURRENT_FUNCTION: INDEPENDENT

## 2024-08-07 ASSESSMENT — ENCOUNTER SYMPTOMS
PAIN LOCATION - PAIN SEVERITY: 6/10
PAIN LOCATION - PAIN QUALITY: ACHE
LOWEST PAIN SEVERITY IN PAST 24 HOURS: 2/10
HIGHEST PAIN SEVERITY IN PAST 24 HOURS: 6/10
PAIN LOCATION - PAIN DURATION: MIN
PAIN LOCATION - RELIEVING FACTORS: CP
LIMITED RANGE OF MOTION: 1
PAIN SEVERITY GOAL: 0/10
SUBJECTIVE PAIN PROGRESSION: GRADUALLY IMPROVING
MUSCLE WEAKNESS: 1
PAIN: 1
PAIN LOCATION: RIGHT KNEE
PAIN LOCATION - PAIN FREQUENCY: INTERMITTENT
PAIN LOCATION - EXACERBATING FACTORS: ROM

## 2024-08-12 ENCOUNTER — HOME CARE VISIT (OUTPATIENT)
Dept: HOME HEALTH SERVICES | Facility: HOME HEALTH | Age: 64
End: 2024-08-12
Payer: COMMERCIAL

## 2024-08-12 VITALS — OXYGEN SATURATION: 98 % | HEART RATE: 96 BPM | RESPIRATION RATE: 14 BRPM

## 2024-08-12 PROCEDURE — G0151 HHCP-SERV OF PT,EA 15 MIN: HCPCS

## 2024-08-12 SDOH — HEALTH STABILITY: PHYSICAL HEALTH: EXERCISE TYPE: TKA

## 2024-08-12 ASSESSMENT — ENCOUNTER SYMPTOMS
LOWEST PAIN SEVERITY IN PAST 24 HOURS: 2/10
PAIN LOCATION - PAIN FREQUENCY: INTERMITTENT
MUSCLE WEAKNESS: 1
PAIN LOCATION - PAIN DURATION: HR
PAIN LOCATION - EXACERBATING FACTORS: ROM
PAIN LOCATION - PAIN SEVERITY: 5/10
PAIN LOCATION - RELIEVING FACTORS: MEDS
HIGHEST PAIN SEVERITY IN PAST 24 HOURS: 5/10
PAIN SEVERITY GOAL: 1/10
LIMITED RANGE OF MOTION: 1
PAIN: 1
PAIN LOCATION: RIGHT KNEE
PERSON REPORTING PAIN: PATIENT
PAIN LOCATION - PAIN QUALITY: ACHE

## 2024-08-12 ASSESSMENT — ACTIVITIES OF DAILY LIVING (ADL)
CURRENT_FUNCTION: INDEPENDENT
PHYSICAL TRANSFERS ASSESSED: 1
AMBULATION ASSISTANCE ON FLAT SURFACES: 1
AMBULATION_DISTANCE/DURATION_TOLERATED: 100 FT

## 2024-08-14 ENCOUNTER — HOME CARE VISIT (OUTPATIENT)
Dept: HOME HEALTH SERVICES | Facility: HOME HEALTH | Age: 64
End: 2024-08-14
Payer: COMMERCIAL

## 2024-08-14 PROCEDURE — G0151 HHCP-SERV OF PT,EA 15 MIN: HCPCS

## 2024-08-14 SDOH — HEALTH STABILITY: PHYSICAL HEALTH: EXERCISE ACTIVITIES SETS: 3

## 2024-08-14 SDOH — HEALTH STABILITY: PHYSICAL HEALTH: PHYSICAL EXERCISE: 15

## 2024-08-14 SDOH — HEALTH STABILITY: PHYSICAL HEALTH: EXERCISE ACTIVITY: OPEN/ CLOSED CHAIN EX

## 2024-08-14 SDOH — HEALTH STABILITY: PHYSICAL HEALTH: PHYSICAL EXERCISE: SIT, SUPINE

## 2024-08-14 SDOH — HEALTH STABILITY: PHYSICAL HEALTH: EXERCISE TYPE: TKA

## 2024-08-14 ASSESSMENT — ENCOUNTER SYMPTOMS
PAIN SEVERITY GOAL: 0/10
PAIN LOCATION: RIGHT KNEE
PERSON REPORTING PAIN: PATIENT
LOWEST PAIN SEVERITY IN PAST 24 HOURS: 1/10
PAIN LOCATION - PAIN DURATION: MIN
HIGHEST PAIN SEVERITY IN PAST 24 HOURS: 4/10
SUBJECTIVE PAIN PROGRESSION: GRADUALLY IMPROVING
PAIN LOCATION - PAIN QUALITY: ACHE
PAIN LOCATION - RELIEVING FACTORS: MEDS, CP
PAIN LOCATION - EXACERBATING FACTORS: ROM
PAIN: 1
PAIN LOCATION - PAIN FREQUENCY: INTERMITTENT
LIMITED RANGE OF MOTION: 1
PAIN LOCATION - PAIN SEVERITY: 4/10
MUSCLE WEAKNESS: 1

## 2024-08-14 ASSESSMENT — ACTIVITIES OF DAILY LIVING (ADL)
CURRENT_FUNCTION: INDEPENDENT
OASIS_M1830: 00
HOME_HEALTH_OASIS: 00
PHYSICAL TRANSFERS ASSESSED: 1
AMBULATION_DISTANCE/DURATION_TOLERATED: 100 FT
AMBULATION ASSISTANCE ON FLAT SURFACES: 1

## 2024-08-17 DIAGNOSIS — D50.8 OTHER IRON DEFICIENCY ANEMIA: ICD-10-CM

## 2024-08-19 RX ORDER — FERROUS SULFATE 325(65) MG
325 TABLET ORAL
Qty: 90 TABLET | Refills: 1 | Status: SHIPPED | OUTPATIENT
Start: 2024-08-19 | End: 2024-09-18

## 2024-08-20 ENCOUNTER — EVALUATION (OUTPATIENT)
Dept: PHYSICAL THERAPY | Facility: CLINIC | Age: 64
End: 2024-08-20
Payer: COMMERCIAL

## 2024-08-20 DIAGNOSIS — M25.661 KNEE STIFFNESS, RIGHT: Primary | ICD-10-CM

## 2024-08-20 PROCEDURE — 97110 THERAPEUTIC EXERCISES: CPT | Mod: GP | Performed by: PHYSICAL THERAPIST

## 2024-08-20 PROCEDURE — 97161 PT EVAL LOW COMPLEX 20 MIN: CPT | Mod: GP | Performed by: PHYSICAL THERAPIST

## 2024-08-20 NOTE — PROGRESS NOTES
Physical Therapy    Physical Therapy Evaluation and Treatment      Patient Name: Megan Wolf  MRN: [id number]  Today's Date: 8/21/2024    Time Entry:   Time Calculation  Start Time: 1055  Stop Time: 1145  Time Calculation (min): 50 min  PT Evaluation Time Entry  PT Evaluation (Low) Time Entry: 25  PT Therapeutic Procedures Time Entry  Therapeutic Exercise Time Entry: 20       Visit: 1  Insurance:  Traditional   Auth: Yes   30 visits     Assessment:  Pt presents to clinic following R TKA performed by Dr. Montero on 7/24/24. Pt demonstrates significant deficits in knee AROM, gait deficits, and decreased LE strength. Pt will benefit from skilled therapy to address current impairments for improved function and strength  Patient may be limited in AROM gains d/t significant limitations in range int he R knee prior to her TKA     Plan:  OP PT Plan  Treatment/Interventions: Cryotherapy, Education/ Instruction, Gait training, Hot pack, Manual therapy, Neuromuscular re-education, Self care/ home management, Therapeutic activities, Therapeutic exercises  PT Plan: Skilled PT  PT Frequency: 1 time per week  Duration: 4-6 weeks  Onset Date: 07/24/24  Rehab Potential: Fair (d/t limited ROM prior to TKA)  Plan of Care Agreement: Patient    Current Problem:   Problem List Items Addressed This Visit             ICD-10-CM    Knee stiffness, right - Primary M25.661    Relevant Orders    Follow Up In Physical Therapy         Subjective    General:  General  Reason for Referral: R TKA  Referred By: Dr. Ian Montero  Preferred Learning Style: verbal, visual, written  Precautions:  Precautions  STEADI Fall Risk Score (The score of 4 or more indicates an increased risk of falling): 3  Precautions Comment: Pt is fall risk     Chief complaints:   Pt presents to clinic with chief complaint of R knee stiffness and pain following R TKA performed by Dr. Montero on 7/24/24  Pt states pain is well managed and she feels her knee is improving  overall   Onset/Surgery Date: 7/24/24  Mechanism of Injury: OA  Previous History: Yes     Pain: 0/10      Location: R knee     Type: stiffness    Aggravators: transfers, walking distance, steps    Alleviators: rest      Function:    Prior Level: fully functional with use of rollator, SPC    Current limitations: work duties; see aggravators     Condition: Improving       Home Situation:    Multi-level home with bedroom on the second floor     Sleep:     Disturbed: No     Preferred position(s):       Goals for Therapy:    Increase knee AROM     Objective      Observation:    Mild to moderate edema R knee    Incision healing well; small areas of erythema, no significant drainage noted       ROM/Flexibility:    Knee Flexion R / L :      94 deg / 100 deg     Knee Extension R / L :  (20) deg / (25) deg          Strength R / L :    Hip Flexion:     4+ / 5    Hip Abduction:    4+ / 5    Hip Adduction:    4+ / 5      Knee Extension:   4+/ 5    Knee Flexion:       4- / 5      Ankle DF:             5 / 5    Ankle PF:              5 / 5     Gait: flexed knee posturing with ambulation, decreased step length and height, decreased anderson   Pt ambulating with SPC      Special Tests R / L :     Anterior Drawer:    - / -    Lachman:               - / -     Posterior Drawer:   - / -     Posterior Sag:         - / -    Varus @ 0` :            - / -    Varus @ 30` :          - / -    Valgus @ 0` :          - / -    Valgus @ 30` :        - / -     [place]:             - / -      Outcome Measure:    LEFS : 53 / 80       TREATMENT:  Therapeutic exercise  Quad sets x 20 (ankle, knee) with 5 second hold  SAQ x 20 with 5 second hold  Heel slides x 20   Knee extension stretch x 2 min       Goals:  Active       PT Problem       Pt will increase R knee AROM to (10)-105 deg for improved mobility       Start:  08/21/24    Expected End:  11/18/24            Pt will increase bilateral LE strength by 1 MMT grade for improved LE stability         Start:  08/21/24    Expected End:  11/18/24            Pt will be independent in HEP for home maintenance        Start:  08/21/24    Expected End:  11/18/24            Pt will increase LEFS score by 10 points for improved ability to return to regular work duties        Start:  08/21/24    Expected End:  11/18/24

## 2024-08-21 ASSESSMENT — ENCOUNTER SYMPTOMS
LOSS OF SENSATION IN FEET: 0
OCCASIONAL FEELINGS OF UNSTEADINESS: 0
DEPRESSION: 0

## 2024-09-06 ENCOUNTER — TREATMENT (OUTPATIENT)
Dept: PHYSICAL THERAPY | Facility: CLINIC | Age: 64
End: 2024-09-06
Payer: COMMERCIAL

## 2024-09-06 DIAGNOSIS — M25.661 KNEE STIFFNESS, RIGHT: ICD-10-CM

## 2024-09-06 PROCEDURE — 97110 THERAPEUTIC EXERCISES: CPT | Mod: GP,CQ

## 2024-09-06 NOTE — PROGRESS NOTES
"Physical Therapy    Physical Therapy  Treatment      Patient Name: Megan Wolf  MRN: [id number]  Today's Date: 9/6/2024    Time Entry:   Time Calculation  Start Time: 1255  Stop Time: 1345  Time Calculation (min): 50 min     PT Therapeutic Procedures Time Entry  Therapeutic Exercise Time Entry: 40       Visit: 2  Insurance:  Traditional   Auth: Yes   30 visits     Assessment:  VC's for sequencing and hold times, as well as limiting hip ER to keep R LE in neutral.    Plan:   Cont ROM, strength    Current Problem:   Problem List Items Addressed This Visit             ICD-10-CM    Knee stiffness, right M25.661           Subjective    \"I am seeing the surgeon in 3 days.  No plans to get the other knee done.\"\"    Pain: 7/10      Objective      AROM R knee 95`-18` initially, 95`-15` EOS      ROM/Flexibility:    Knee Flexion R / L :      94 deg / 100 deg     Knee Extension R / L :  (20) deg / (25) deg          Strength R / L :    Hip Flexion:     4+ / 5    Hip Abduction:    4+ / 5    Hip Adduction:    4+ / 5      Knee Extension:   4+/ 5    Knee Flexion:       4- / 5           Outcome Measure:    LEFS : 53 / 80       TREATMENT:  Therapeutic exercise  Passive ext stretch x 2 min  QS x 20 ea, knee/ankle  HS strap/board x 20  SAQ x 20  Ball squeeze/Black band hip abd x 20 ea  LAQ into HSC x 20  Slant board calf stretch 10x10\"  Standing knee flex stretch on step 10x10\"  Nu Step x 3 min  Supine ice wrap to R knee EOS      Goals:  Active       PT Problem       Pt will increase R knee AROM to (10)-105 deg for improved mobility       Start:  08/21/24    Expected End:  11/18/24            Pt will increase bilateral LE strength by 1 MMT grade for improved LE stability        Start:  08/21/24    Expected End:  11/18/24            Pt will be independent in HEP for home maintenance        Start:  08/21/24    Expected End:  11/18/24            Pt will increase LEFS score by 10 points for improved ability to return to regular work " duties        Start:  08/21/24    Expected End:  11/18/24

## 2024-09-09 ENCOUNTER — HOSPITAL ENCOUNTER (OUTPATIENT)
Dept: RADIOLOGY | Facility: CLINIC | Age: 64
Discharge: HOME | End: 2024-09-09
Payer: COMMERCIAL

## 2024-09-09 ENCOUNTER — OFFICE VISIT (OUTPATIENT)
Dept: ORTHOPEDIC SURGERY | Facility: CLINIC | Age: 64
End: 2024-09-09
Payer: COMMERCIAL

## 2024-09-09 DIAGNOSIS — M17.11 PRIMARY OSTEOARTHRITIS OF RIGHT KNEE: ICD-10-CM

## 2024-09-09 DIAGNOSIS — Z47.1 AFTERCARE FOLLOWING RIGHT KNEE JOINT REPLACEMENT SURGERY: Primary | ICD-10-CM

## 2024-09-09 DIAGNOSIS — Z96.651 AFTERCARE FOLLOWING RIGHT KNEE JOINT REPLACEMENT SURGERY: Primary | ICD-10-CM

## 2024-09-09 PROCEDURE — 73560 X-RAY EXAM OF KNEE 1 OR 2: CPT | Mod: RIGHT SIDE | Performed by: RADIOLOGY

## 2024-09-09 PROCEDURE — 73560 X-RAY EXAM OF KNEE 1 OR 2: CPT | Mod: RT

## 2024-09-09 PROCEDURE — 99211 OFF/OP EST MAY X REQ PHY/QHP: CPT | Performed by: ORTHOPAEDIC SURGERY

## 2024-09-09 NOTE — PROGRESS NOTES
This 64-year-old woman returns today proximately 6 weeks status post left total knee replacement performed on July 24, 2024.  The patient is currently in outpatient physical therapy.  The patient is without complaints at this time.    Physical Exam:  The patient is well-nourished and well-developed and in no acute distress. The patient displayed normal mood and affect.  Patient's respirations appear to be regular and unlabored.  The wound is healing without evidence of infection.  The knee comes to within 10 degrees of full extension flexes to 90 degrees.  Stability is full and straight leg raising is without lag.  There is a mild effusion in the knee.  There is no tenderness.  The calf is soft and nontender and without swelling.  The neurovascular exam is intact.  Good alignment is noted.    Imaging:  I personally reviewed and measured the radiographs including AP and lateral views of the extremity and they revealed good alignment of the total knee replacement.    Impression & Plan:   It is my impression this patient is doing reasonably well because she is already advanced past her preoperative range of motion which was a 25 degree flexion contracture and flexion limited to 90 degrees.  I have instructed her in daily exercises for both flexion contracture and to increase her flexion.  She should continue in outpatient physical therapy.  I discussed both mechanical and biologic prophylaxis for her knee again gave her one of our antibiotic prophylaxis forms.    I would like to see the patient back in 2 months for reexam no x-rays needed when she is having problems.      Note dictated with [employer] software.  Completed without full type editing and sent to avoid delay.

## 2024-09-13 ENCOUNTER — DOCUMENTATION (OUTPATIENT)
Dept: PHYSICAL THERAPY | Facility: CLINIC | Age: 64
End: 2024-09-13
Payer: COMMERCIAL

## 2024-09-13 NOTE — PROGRESS NOTES
Physical Therapy                 Therapy Communication Note    Patient Name: Megan Wolf  MRN: [id number]  Department: [place] [address]  Room: Room/bed info not found  Today's Date: 9/13/2024     Discipline: Physical Therapy    Missed Visit Reason:      Missed Time: No Show    Comment:  First No Show

## 2024-09-20 ENCOUNTER — TREATMENT (OUTPATIENT)
Dept: PHYSICAL THERAPY | Facility: CLINIC | Age: 64
End: 2024-09-20
Payer: COMMERCIAL

## 2024-09-20 DIAGNOSIS — M25.661 KNEE STIFFNESS, RIGHT: ICD-10-CM

## 2024-09-20 PROCEDURE — 97110 THERAPEUTIC EXERCISES: CPT | Mod: GP,CQ

## 2024-09-20 NOTE — PROGRESS NOTES
"Physical Therapy    Physical Therapy  Treatment      Patient Name: Megan Wolf  MRN: [id number]  Today's Date: 9/20/2024    Time Entry:   Time Calculation  Start Time: 1245  Stop Time: 1355  Time Calculation (min): 70 min     PT Therapeutic Procedures Time Entry  Therapeutic Exercise Time Entry: 50       Visit: 3  Insurance:  Traditional   Auth: Yes   30 visits     Assessment:  Advised pt to perform ex's bilaterally w/o pain.  Pt needs cues for neutral LE in supine, Showing slight gains in flexion, w/ slight regression in extension.    Plan:   Cont ROM, strength    Current Problem:   Problem List Items Addressed This Visit             ICD-10-CM    Knee stiffness, right M25.661           Subjective    \"L knee is really bothering me.\"    Pain: 7/10      Objective      AROM R knee 103`-22` initially      ROM/Flexibility:    Knee Flexion R / L :      94 deg / 100 deg     Knee Extension R / L :  (20) deg / (25) deg          Strength R / L :    Hip Flexion:     4+ / 5    Hip Abduction:    4+ / 5    Hip Adduction:    4+ / 5      Knee Extension:   4+/ 5    Knee Flexion:       4- / 5           Outcome Measure:    LEFS : 53 / 80       TREATMENT:  HP to R knee x 10 min initially, supine  Therapeutic exercise  Manual extension stretching, C/R  SAQ x 20  Bridges x 20  QS x 20 ea, knee/ankle  HS strap/board x 20  LAQ into HSC x 20  Slant board calf stretch 10x10\"  Standing knee flex stretch on step 10x10\"  Mini Squats x 20  Nu Step x 5 min  Seated knee extension stretch x 5 min, #5      Goals:  Active       PT Problem       Pt will increase R knee AROM to (10)-105 deg for improved mobility       Start:  08/21/24    Expected End:  11/18/24            Pt will increase bilateral LE strength by 1 MMT grade for improved LE stability        Start:  08/21/24    Expected End:  11/18/24            Pt will be independent in HEP for home maintenance        Start:  08/21/24    Expected End:  11/18/24            Pt will increase LEFS " Addended by: KOEPPEN, MARY ANN on: 10/9/2020 08:10 AM     Modules accepted: Orders     score by 10 points for improved ability to return to regular work duties        Start:  08/21/24    Expected End:  11/18/24

## 2024-09-23 DIAGNOSIS — E87.6 HYPOKALEMIA: ICD-10-CM

## 2024-09-23 DIAGNOSIS — I10 BENIGN ESSENTIAL HTN: ICD-10-CM

## 2024-09-23 RX ORDER — LISINOPRIL AND HYDROCHLOROTHIAZIDE 20; 25 MG/1; MG/1
1 TABLET ORAL DAILY
Qty: 90 TABLET | Refills: 0 | OUTPATIENT
Start: 2024-09-23

## 2024-09-23 RX ORDER — POTASSIUM CHLORIDE 20 MEQ/1
20 TABLET, EXTENDED RELEASE ORAL DAILY
Qty: 90 TABLET | Refills: 0 | OUTPATIENT
Start: 2024-09-23

## 2024-09-27 DIAGNOSIS — I10 BENIGN ESSENTIAL HTN: ICD-10-CM

## 2024-09-27 DIAGNOSIS — E87.6 HYPOKALEMIA: ICD-10-CM

## 2024-09-28 RX ORDER — POTASSIUM CHLORIDE 20 MEQ/1
20 TABLET, EXTENDED RELEASE ORAL DAILY
Qty: 90 TABLET | Refills: 0 | OUTPATIENT
Start: 2024-09-28

## 2024-09-28 RX ORDER — LISINOPRIL AND HYDROCHLOROTHIAZIDE 20; 25 MG/1; MG/1
1 TABLET ORAL DAILY
Qty: 90 TABLET | Refills: 0 | OUTPATIENT
Start: 2024-09-28

## 2024-10-01 NOTE — PROGRESS NOTES
"Subjective   Patient ID: Megan Wolf is a 64 y.o. female who presents for Annual Exam.    Last Annual Physical: April 2023   Last Dental Visit: No recent dental visit   Last Eye exam: No recent eye exam   Hearing Concerns: No   Diet: Unhealthy diet   Exercise Routine: Regular exercise       HPI   The patient reports that she is taking lisinopril-hydrochlorothiazide for hypertension and potassium supplements for hypokalemia. She is taking these medications as prescribed and denies having side effects from them. She is status-post a right total knee replacement and states that she has been doing well, although she has not been able to get back into her work out routine yet.    Review of Systems  Constitutional: No fever or chills, No Night Sweats  Eyes: No Blurry Vision or Eye sight problems  ENT: No Nasal Discharge, Hoarseness, sore throat  Cardiovascular: no chest pain, no palpitations and no syncope.   Respiratory: no cough, no shortness of breath during exertion and no shortness of breath at rest.   Gastrointestinal: no abdominal pain, no nausea and no vomiting.   : No vaginal discharge, burning with urination, no blood in urine or stools  Skin: No Skin rashes or Lesions  Neuro: No Headache, no dizziness or Numbness or tingling  Psych: No Anxiety, depression or sleeping problems  Heme: No Easy bleeding or brusing.     Objective   /62   Pulse 85   Ht 1.676 m (5' 6\")   Wt 77.6 kg (171 lb)   SpO2 98%   BMI 27.60 kg/m²     Physical Exam  Constitutional: Alert and in no acute distress. Well developed, well nourished.   Head and Face: Head and face: Normal.    Eyes: Normal external exam. Pupils were equal in size, round, reactive to light (PERRL) with normal accommodation and extraocular movements intact (EOMI).   Ears, Nose, Mouth, and Throat: External inspection of ears and nose: Normal.  Hearing: Normal.  Nasal mucosa, septum, and turbinates: Normal.  Lips, teeth, and gums: Normal.  Oropharynx: " Normal.   Neck: No neck mass was observed. Supple. Thyroid not enlarged and there were no palpable thyroid nodules.   Cardiovascular: Heart rate and rhythm were normal, normal S1 and S2. Pedal pulses: Normal. No peripheral edema.   Pulmonary: No respiratory distress. Clear bilateral breath sounds.   Abdomen: Soft nontender; no abdominal mass palpated. Normal bowel sounds. No organomegaly.   Musculoskeletal: No joint swelling seen, normal movements of all extremities. Range of motion: Normal.  Muscle strength/tone: Normal.    Skin: Ganglionic cyst noted on left wrist.  Normal skin color and pigmentation, normal skin turgor, and no rash.   Neurologic: Deep tendon reflexes were 2+ and symmetric.   Psychiatric: Judgment and insight: Intact. Mood and affect: Normal.  Lymphatic: No cervical lymphadenopathy. Palpation of lymph nodes in axillae: Normal.  Palpation of lymph nodes in groin: Normal.    Lab Results   Component Value Date    WBC 4.9 07/25/2024    HGB 9.0 (L) 07/25/2024    HCT 25.6 (L) 07/25/2024     07/25/2024    CHOL 190 06/18/2024    TRIG 93 06/18/2024    HDL 85.0 06/18/2024    ALT 20 06/24/2024    AST 22 06/24/2024     07/25/2024    K 3.6 07/25/2024     07/25/2024    CREATININE 0.68 07/25/2024    BUN 15 07/25/2024    CO2 27 07/25/2024    TSH 1.83 06/18/2024    HGBA1C 4.7 06/18/2024       XR knee right 1-2 views  Narrative: Interpreted By:  Rey Ramos,   STUDY:  XR KNEE RIGHT 1-2 VIEWS; ;  9/9/2024 2:12 pm      INDICATION:  Signs/Symptoms:POV TKA.      ,M17.11 Unilateral primary osteoarthritis, right knee      COMPARISON:  07/23/2024      ACCESSION NUMBER(S):  [id number]      ORDERING CLINICIAN:  BK BUCHANAN      FINDINGS:  Right knee, two views      Total knee arthroplasty in place. There is a moderate-sized effusion.  There is no periprosthetic fracture or lucency. There is no  dislocation      Impression: No hardware failure about the right total knee arthroplasty       MACRO:  None      Signed by: Rey Ramos 9/10/2024 7:01 PM  Dictation workstation:   [id number]      Assessment/Plan   Diagnoses and all orders for this visit:  Annual physical exam  Benign essential HTN  -     lisinopriL-hydrochlorothiazide 20-25 mg tablet; Take 1 tablet by mouth once daily.  -     potassium chloride CR 20 mEq ER tablet; Take 1 tablet (20 mEq) by mouth once daily. Do not crush or chew.  Encounter for screening for malignant neoplasm of colon  -     Colonoscopy Screening; High Risk Patient; Future  Atypical glandular cells of undetermined significance (MECHE) on cervical Pap smear  -     Referral to Gynecology; Future  Hypokalemia  -     potassium chloride CR 20 mEq ER tablet; Take 1 tablet (20 mEq) by mouth once daily. Do not crush or chew.        Dear Megan Wolf     It was my pleasure to take care of you today in the office. Below are the things we discussed today:    1. 1. Immunizations: Yearly Flu shot is recommended. Up-to-date          a: COVID: Booster up-to-date          b: Tetanus: Up-to-date. Done in 2019         c: Shingrix: The patient will follow up for it          d: RSV: Please get from the pharmacy if you are interested     2. Blood Work: Reviewed   3. Seen your dentist twice a year  4. Yearly Eye exam is recommended    5. BMI: Overweight   6: Diet recommendations:   Eat Clean, Try to have as many home cooked meals as possible  Avoid processed foods which contain excess calories, sugar, and sodium.    7. Exercise recommendations:   150 minutes a week to maintain your weight     If you have to loose weight, you need a better diet and exercise plan.     8. Supplements recommended:  a - Calcium 600 mg up to twice a day to get a total of 1200 mg. Each 8 oz of milk or yogurt or 1 oz of cheese, 1 Banana, 1 serving of green Leafy vegetable has about 300 mg of Calcium, so you may subtract that amount. Calcium citrate is the only acceptable supplement to take if you take an  acid suppressing medication like Prilosec; otherwise Calcium carbonate is acceptable too (It can cause Constipation).   b - Vitamin D - 2000 IU daily     9. Please get your Living will / Advance directive completed if you do not have one already. Please make sure our office has a copy of the latest one.     10. Colonoscopy: Ordered   11. Mammogram: Has been ordered, please schedule  12. PAP: Last done in 2020. Advised her to follow up with OBGYN for her hx of abnormal PAPs.  13. DEXA:  14: Skin Check: Please see Dermatology once a year for a Skin Check.     15. S/P right knee replacement: The patient is doing well.     16. Hypertension: Continue lisinopril-hydrochlorothiazide.    17. Hypokalemia: Continue potassium supplements.    18. Ganglionic cyst noted on left wrist: Advised her to follow up with hand surgery.    19. Keloids: Advised her to use Vitamin E oil and Vaseline.     Follow up in one year for a Physical. Please call the office before your Physical to see if you need blood work completed prior to your physical.     Please call me if any questions arise from now until your next visit. I will call you after I am done seeing patients. A Doctor is always available by phone when the office is closed. Please feel free to call for help with any problem that you feel shouldn't wait until the office re-opens.     Scribe Attestation  By signing my name below, Chacha SIMPSON Scribe   attest that this documentation has been prepared under the direction and in the presence of Ale Callejas MD.

## 2024-10-02 ENCOUNTER — PHARMACY VISIT (OUTPATIENT)
Dept: PHARMACY | Facility: CLINIC | Age: 64
End: 2024-10-02
Payer: COMMERCIAL

## 2024-10-02 ENCOUNTER — OFFICE VISIT (OUTPATIENT)
Dept: PRIMARY CARE | Facility: CLINIC | Age: 64
End: 2024-10-02
Payer: COMMERCIAL

## 2024-10-02 VITALS
OXYGEN SATURATION: 98 % | HEART RATE: 85 BPM | WEIGHT: 171 LBS | DIASTOLIC BLOOD PRESSURE: 62 MMHG | SYSTOLIC BLOOD PRESSURE: 124 MMHG | HEIGHT: 66 IN | BODY MASS INDEX: 27.48 KG/M2

## 2024-10-02 DIAGNOSIS — E87.6 HYPOKALEMIA: ICD-10-CM

## 2024-10-02 DIAGNOSIS — I10 BENIGN ESSENTIAL HTN: ICD-10-CM

## 2024-10-02 DIAGNOSIS — Z12.11 ENCOUNTER FOR SCREENING FOR MALIGNANT NEOPLASM OF COLON: ICD-10-CM

## 2024-10-02 DIAGNOSIS — Z00.00 ANNUAL PHYSICAL EXAM: Primary | ICD-10-CM

## 2024-10-02 DIAGNOSIS — R87.619 ATYPICAL GLANDULAR CELLS OF UNDETERMINED SIGNIFICANCE (AGUS) ON CERVICAL PAP SMEAR: ICD-10-CM

## 2024-10-02 PROBLEM — M17.11 UNILATERAL PRIMARY OSTEOARTHRITIS, RIGHT KNEE: Status: RESOLVED | Noted: 2024-05-22 | Resolved: 2024-10-02

## 2024-10-02 PROBLEM — Z96.651 STATUS POST RIGHT KNEE REPLACEMENT: Status: ACTIVE | Noted: 2024-10-02

## 2024-10-02 PROBLEM — M25.661 KNEE STIFFNESS, RIGHT: Status: RESOLVED | Noted: 2024-08-20 | Resolved: 2024-10-02

## 2024-10-02 PROBLEM — M17.11 PRIMARY OSTEOARTHRITIS OF RIGHT KNEE: Status: RESOLVED | Noted: 2024-04-22 | Resolved: 2024-10-02

## 2024-10-02 PROCEDURE — 3008F BODY MASS INDEX DOCD: CPT | Performed by: FAMILY MEDICINE

## 2024-10-02 PROCEDURE — 99396 PREV VISIT EST AGE 40-64: CPT | Performed by: FAMILY MEDICINE

## 2024-10-02 PROCEDURE — 3078F DIAST BP <80 MM HG: CPT | Performed by: FAMILY MEDICINE

## 2024-10-02 PROCEDURE — 3074F SYST BP LT 130 MM HG: CPT | Performed by: FAMILY MEDICINE

## 2024-10-02 PROCEDURE — 1036F TOBACCO NON-USER: CPT | Performed by: FAMILY MEDICINE

## 2024-10-02 PROCEDURE — RXMED WILLOW AMBULATORY MEDICATION CHARGE

## 2024-10-02 RX ORDER — LISINOPRIL AND HYDROCHLOROTHIAZIDE 20; 25 MG/1; MG/1
1 TABLET ORAL DAILY
Qty: 90 TABLET | Refills: 3 | Status: SHIPPED | OUTPATIENT
Start: 2024-10-02

## 2024-10-02 RX ORDER — POTASSIUM CHLORIDE 20 MEQ/1
20 TABLET, EXTENDED RELEASE ORAL DAILY
Qty: 90 TABLET | Refills: 3 | Status: SHIPPED | OUTPATIENT
Start: 2024-10-02

## 2024-10-02 ASSESSMENT — COLUMBIA-SUICIDE SEVERITY RATING SCALE - C-SSRS
2. HAVE YOU ACTUALLY HAD ANY THOUGHTS OF KILLING YOURSELF?: NO
1. IN THE PAST MONTH, HAVE YOU WISHED YOU WERE DEAD OR WISHED YOU COULD GO TO SLEEP AND NOT WAKE UP?: NO

## 2024-10-02 ASSESSMENT — PATIENT HEALTH QUESTIONNAIRE - PHQ9
2. FEELING DOWN, DEPRESSED OR HOPELESS: NOT AT ALL
1. LITTLE INTEREST OR PLEASURE IN DOING THINGS: NOT AT ALL
SUM OF ALL RESPONSES TO PHQ9 QUESTIONS 1 AND 2: 0

## 2024-10-02 NOTE — PROGRESS NOTES
"Subjective   Patient ID: Megan Wolf is a 64 y.o. female who presents for Annual Exam.    Last Annual Physical: 04/2023  Last Dental Visit: 2022  Last Eye exam: 2022  Hearing Concerns: No  Diet: Regular diet.   Exercise Routine: PT/OT only since knee surgery    HPI   The patient is here for her annual physical exam. Overall she is doing well. She recently had right knee replacement surgery in July and has been receiving physical therapy. She has mild tenderness on her right knee, but she is not concerned about the pain. She believes her knee is healing well. She stopped taking prednisone and oxycodone-acetaminophen. She denies having left knee pain and swelling. She has been taking potassium chloride CR 20mEq for her hypokalemia and lisinopril-hydrochlorothiazie for her benign essential hypertension. She needs refills for potassium and lisinopril-hydrochlorothiazie.    Review of Systems  Constitutional: No fever or chills, No Night Sweats  Eyes: No Blurry Vision or Eye sight problems  ENT: No Nasal Discharge, Hoarseness, sore throat  Cardiovascular: no chest pain, no palpitations and no syncope.   Respiratory: no cough, no shortness of breath during exertion and no shortness of breath at rest.   Gastrointestinal: no abdominal pain, no nausea and no vomiting.   : No vaginal discharge, burning with urination, no blood in urine or stools  Skin: No Skin rashes or Lesions  Neuro: No Headache, no dizziness or Numbness or tingling  Psych: No Anxiety, depression or sleeping problems  Heme: No Easy bleeding or brusing.     Objective   /62   Pulse 85   Ht 1.676 m (5' 6\")   Wt 77.6 kg (171 lb)   SpO2 98%   BMI 27.60 kg/m²     Physical Exam  Patient declined Chaperone  Constitutional: Alert and in no acute distress. Well developed, well nourished.   Head and Face: Head and face: Normal.    Eyes: Normal external exam. Pupils were equal in size, round, reactive to light (PERRL) with normal accommodation and " extraocular movements intact (EOMI).   Ears, Nose, Mouth, and Throat: External inspection of ears and nose: Normal.  Hearing: Normal.  Nasal mucosa, septum, and turbinates: Normal.  Lips, teeth, and gums: Normal.  Oropharynx: Normal.   Neck: No neck mass was observed. Supple. Thyroid not enlarged and there were no palpable thyroid nodules.   Cardiovascular: Heart rate and rhythm were normal, normal S1 and S2. Pedal pulses: Normal. No peripheral edema.   Pulmonary: No respiratory distress. Clear bilateral breath sounds.   Breast: Normal Appearance, No Masses or lumps palpated  Abdomen: Soft nontender; no abdominal mass palpated. Normal bowel sounds. No organomegaly.   : Deferred   Musculoskeletal: No joint swelling seen, normal movements of all extremities. Range of motion: Normal.  Muscle strength/tone: Normal. + ganglion cyst on her right wrist, + mild keloid scar on her right knee surgery site  Skin: Normal skin color and pigmentation, normal skin turgor, and no rash.   Neurologic: Deep tendon reflexes were 2+ and symmetric.   Psychiatric: Judgment and insight: Intact. Mood and affect: Normal.  Lymphatic: No cervical lymphadenopathy. Palpation of lymph nodes in axillae: Normal.  Palpation of lymph nodes in groin: Normal.    Lab Results   Component Value Date    WBC 4.9 07/25/2024    HGB 9.0 (L) 07/25/2024    HCT 25.6 (L) 07/25/2024     07/25/2024    CHOL 190 06/18/2024    TRIG 93 06/18/2024    HDL 85.0 06/18/2024    ALT 20 06/24/2024    AST 22 06/24/2024     07/25/2024    K 3.6 07/25/2024     07/25/2024    CREATININE 0.68 07/25/2024    BUN 15 07/25/2024    CO2 27 07/25/2024    TSH 1.83 06/18/2024    HGBA1C 4.7 06/18/2024       XR knee right 1-2 views  Narrative: Interpreted By:  Rey Ramos,   STUDY:  XR KNEE RIGHT 1-2 VIEWS; ;  9/9/2024 2:12 pm      INDICATION:  Signs/Symptoms:POV TKA.      ,M17.11 Unilateral primary osteoarthritis, right knee      COMPARISON:  07/23/2024      ACCESSION  NUMBER(S):  [id number]      ORDERING CLINICIAN:  BK BUCHANAN      FINDINGS:  Right knee, two views      Total knee arthroplasty in place. There is a moderate-sized effusion.  There is no periprosthetic fracture or lucency. There is no  dislocation      Impression: No hardware failure about the right total knee arthroplasty      MACRO:  None      Signed by: Rey Ramos 9/10/2024 7:01 PM  Dictation workstation:   [id number]      Assessment/Plan   Problem List Items Addressed This Visit             ICD-10-CM    Atypical glandular cells of undetermined significance (MECHE) on cervical Pap smear R87.619    Relevant Orders    Referral to Gynecology    Benign essential HTN I10    Relevant Medications    lisinopriL-hydrochlorothiazide 20-25 mg tablet    potassium chloride CR 20 mEq ER tablet    Hypokalemia E87.6    Relevant Medications    potassium chloride CR 20 mEq ER tablet    Annual physical exam - Primary Z00.00     Other Visit Diagnoses         Codes    Encounter for screening for malignant neoplasm of colon     Z12.11    Relevant Orders    Colonoscopy Screening; High Risk Patient          Dear Megan Wolf     It was my pleasure to take care of you today in the office. Below are the things we discussed today:    1. 1. Immunizations: Yearly Flu shot is recommended.          a: COVID: Up-to-Date         b: Tetanus: Up-to-date         c: Shingrix: Recommend to get at pharmacy         d: RSV:  Recommend to get at pharmacy         e: Prevnar: Up-to-date    2. Blood Work: 06/2024  3. Seen your dentist twice a year  4. Yearly Eye exam is recommended    5. BMI: 27.60  6: Diet recommendations:   Eat Clean, Try to have as many home cooked meals as possible  Avoid processed foods which contain excess calories, sugar, and sodium.    7. Exercise recommendations:   150 minutes a week to maintain your weight     If you have to loose weight, you need a better diet and exercise plan.     8. Supplements recommended:  a  - Calcium 600 mg up to twice a day to get a total of 1200 mg. Each 8 oz of milk or yogurt or 1 oz of cheese, 1 Banana, 1 serving of green Leafy vegetable has about 300 mg of Calcium, so you may subtract that amount. Calcium citrate is the only acceptable supplement to take if you take an acid suppressing medication like Prilosec; otherwise Calcium carbonate is acceptable too (It can cause Constipation).   b - Vitamin D - 2000 IU daily     9. Please get your Living will / Advance directive completed if you do not have one already. Please make sure our office has a copy of the latest one.     10. Colonoscopy: Past due.   11. Mammogram: Past due.   12. PAP: Follow up with gyn regarding MECHE  13. DEXA: Up-to-date  14: Skin Check: Please see Dermatology once a year for a Skin Check.     Follow up in one year for a Physical. Please call the office before your Physical to see if you need blood work completed prior to your physical.     Please call me if any questions arise from now until your next visit. I will call you after I am done seeing patients. A Doctor is always available by phone when the office is closed. Please feel free to call for help with any problem that you feel shouldn't wait until the office re-opens.     DEE LUCIO, MS3

## 2024-10-10 ENCOUNTER — OFFICE VISIT (OUTPATIENT)
Dept: ORTHOPEDIC SURGERY | Facility: CLINIC | Age: 64
End: 2024-10-10
Payer: COMMERCIAL

## 2024-10-10 DIAGNOSIS — M17.12 PRIMARY OSTEOARTHRITIS OF LEFT KNEE: Primary | ICD-10-CM

## 2024-10-10 PROCEDURE — 2500000004 HC RX 250 GENERAL PHARMACY W/ HCPCS (ALT 636 FOR OP/ED): Performed by: ORTHOPAEDIC SURGERY

## 2024-10-10 PROCEDURE — 20610 DRAIN/INJ JOINT/BURSA W/O US: CPT | Mod: LT | Performed by: ORTHOPAEDIC SURGERY

## 2024-10-10 PROCEDURE — 99213 OFFICE O/P EST LOW 20 MIN: CPT | Performed by: ORTHOPAEDIC SURGERY

## 2024-10-10 RX ORDER — TRIAMCINOLONE ACETONIDE 40 MG/ML
1 INJECTION, SUSPENSION INTRA-ARTICULAR; INTRAMUSCULAR
Status: COMPLETED | OUTPATIENT
Start: 2024-10-10 | End: 2024-10-10

## 2024-10-10 RX ORDER — LIDOCAINE HYDROCHLORIDE 10 MG/ML
2 INJECTION, SOLUTION INFILTRATION; PERINEURAL
Status: COMPLETED | OUTPATIENT
Start: 2024-10-10 | End: 2024-10-10

## 2024-10-10 NOTE — LETTER
October 10, 2024     Ale Callejas MD  [address]  [address]  [facility] [address]    Patient: Megan Wolf   YOB: 1960   Date of Visit: 10/10/2024       Dear Dr. Ale Callejas MD:    Thank you for referring Megan Wolf to me for evaluation. Below are my notes for this consultation.  If you have questions, please do not hesitate to call me. I look forward to following your patient along with you.       Sincerely,     Ian Montero MD      CC: No Recipients  ______________________________________________________________________________________    Patient ID: Megan Wolf is a 64 y.o. female.    L Inj/Asp: L knee on 10/10/2024 1:47 PM  Indications: pain  Details: 20 G needle, anterior approach  Medications: 1 mL triamcinolone acetonide 40 mg/mL; 2 mL lidocaine 10 mg/mL (1 %)  Outcome: tolerated well, no immediate complications  Procedure, treatment alternatives, risks and benefits explained, specific risks discussed. Consent was given by the patient.           This 64-year-old woman returns today with her mother for continuing problems with her left knee.  Patient and her mother tell me the primary problem is stiffness in the left knee.  She is concerned that she cannot straighten the knee all the way.  Patient notes the intra-articular steroid injection that I gave her 4 months ago did seem to alleviate her pain and she is having minimal pain at present.    Review of systems:  A complete review of the patient's past medical history and review of 30 systems and all medications and allergies was performed. Please see adult medical record for details.    A Family history for genetic or familial inheritance issues and Social history including smoking history, alcohol consumption and exercise activities was also reviewed and significant findings noted in the patients history of present illness.    Physical Exam:  The patient is well-nourished and well-developed and in no acute  distress. The patient displayed normal mood and affect. The patient's pupils were equal, round sclerae are white. Patient's respirations appear to be regular and unlabored.     Physical examination of the left knee revealed no effusion in the knee and there were no skin abnormalities or lymphangitis. The knee demonstrated Valgus alignment. There was no tenderness about the knee, thigh or calf. There was tenderness at the lateral joint space. There was discomfort with patellofemoral compression. The knee came to within 20 degrees of full extension. Straight leg raising was without lag, flexion was to 100 degrees and ligamentous stability was full. The neurovascular examination extremity was intact.The neurological exam including motor and sensory exam was performed. The vascular examination including palpation of pulses and capillary refill of the foot was performed and determined to be intact.    Impression & Plan:   It is my impression this patient has severe arthritis in her left knee with deformity and marked loss of motion.  Once again I explained to the patient and her mother that she would greatly benefit with a total knee replacement.  It is the only thing that has a chance of restoring her motion.  The injections do not restore her motion.  Patient adamantly tells me she does not want to have any surgery.  I explained that the best predictor of postoperative range of motion with knee replacement is preoperative range of motion and my concerns regarding waiting too long to have her knee replacement if she wants to have any chance of restoring or improving her current motion.    The patient would like to continue with conservative care and wants an intra-articular steroid injection.  After adequate informed consent I injected the left knee with 40 mg of Kenalog and local anesthesia.    I explained to the patient and her mother that I am retiring at the end of this year and I have referred the patient to   Leobardo Gilmore an expert in knee joint replacement and knee arthritis for further treatments.      Note dictated with [employer] software.  Completed without full type editing and sent to avoid delay.

## 2024-10-10 NOTE — PROGRESS NOTES
This 64-year-old woman returns today with her mother for continuing problems with her left knee.  Patient and her mother tell me the primary problem is stiffness in the left knee.  She is concerned that she cannot straighten the knee all the way.  Patient notes the intra-articular steroid injection that I gave her 4 months ago did seem to alleviate her pain and she is having minimal pain at present.    Review of systems:  A complete review of the patient's past medical history and review of 30 systems and all medications and allergies was performed. Please see adult medical record for details.    A Family history for genetic or familial inheritance issues and Social history including smoking history, alcohol consumption and exercise activities was also reviewed and significant findings noted in the patients history of present illness.    Physical Exam:  The patient is well-nourished and well-developed and in no acute distress. The patient displayed normal mood and affect. The patient's pupils were equal, round sclerae are white. Patient's respirations appear to be regular and unlabored.     Physical examination of the left knee revealed no effusion in the knee and there were no skin abnormalities or lymphangitis. The knee demonstrated Valgus alignment. There was no tenderness about the knee, thigh or calf. There was tenderness at the lateral joint space. There was discomfort with patellofemoral compression. The knee came to within 20 degrees of full extension. Straight leg raising was without lag, flexion was to 100 degrees and ligamentous stability was full. The neurovascular examination extremity was intact.The neurological exam including motor and sensory exam was performed. The vascular examination including palpation of pulses and capillary refill of the foot was performed and determined to be intact.    Impression & Plan:   It is my impression this patient has severe arthritis in her left knee with deformity  and marked loss of motion.  Once again I explained to the patient and her mother that she would greatly benefit with a total knee replacement.  It is the only thing that has a chance of restoring her motion.  The injections do not restore her motion.  Patient adamantly tells me she does not want to have any surgery.  I explained that the best predictor of postoperative range of motion with knee replacement is preoperative range of motion and my concerns regarding waiting too long to have her knee replacement if she wants to have any chance of restoring or improving her current motion.    The patient would like to continue with conservative care and wants an intra-articular steroid injection.  After adequate informed consent I injected the left knee with 40 mg of Kenalog and local anesthesia.    I explained to the patient and her mother that I am retiring at the end of this year and I have referred the patient to Dr. Leobardo Gilmore an expert in knee joint replacement and knee arthritis for further treatments.      Note dictated with [employer] software.  Completed without full type editing and sent to avoid delay.

## 2024-10-10 NOTE — PROGRESS NOTES
Patient ID: Megan Wolf is a 64 y.o. female.    L Inj/Asp: L knee on 10/10/2024 1:47 PM  Indications: pain  Details: 20 G needle, anterior approach  Medications: 1 mL triamcinolone acetonide 40 mg/mL; 2 mL lidocaine 10 mg/mL (1 %)  Outcome: tolerated well, no immediate complications  Procedure, treatment alternatives, risks and benefits explained, specific risks discussed. Consent was given by the patient.

## 2024-11-11 ENCOUNTER — OFFICE VISIT (OUTPATIENT)
Dept: ORTHOPEDIC SURGERY | Facility: CLINIC | Age: 64
End: 2024-11-11
Payer: COMMERCIAL

## 2024-11-11 DIAGNOSIS — Z96.651 STATUS POST RIGHT KNEE REPLACEMENT: Primary | ICD-10-CM

## 2024-11-11 PROCEDURE — 99213 OFFICE O/P EST LOW 20 MIN: CPT | Performed by: ORTHOPAEDIC SURGERY

## 2024-11-11 NOTE — PROGRESS NOTES
This 64-year-old woman was seen with her mother for 1/2-month follow-up on her right total knee replacement.  The patient is without complaints at this time not having any pain and has resumed her regular activities including return to work.  Fortunately the patient notes she stopped doing her exercises because she has returned to work.    Review of systems:  A complete review of the patient's past medical history and review of 30 systems and all medications and allergies was performed. Please see adult medical record for details.    A Family history for genetic or familial inheritance issues and Social history including smoking history, alcohol consumption and exercise activities was also reviewed and significant findings noted in the patients history of present illness.    Physical Exam:  The patient is well-nourished and well-developed and in no acute distress. The patient displayed normal mood and affect. The patient's pupils were equal, round sclerae are white. Patient's respirations appear to be regular and unlabored.     Physical examination of the right knee revealed well-healed midline incision, no effusion in the knee and no deformity.  There were no skin abnormalities or lymphangitis. The knee demonstrated normal alignment. There was no tenderness about the knee, thigh or calf. There was no tenderness at the medial or lateral joint space. There was no pain with patellofemoral compression. The knee came to within 10 degrees of full extension and flexed to 95°. Straight leg raising was without lag.  There was full ligamentous stability.  The neurovascular examination of the extremity was intact.The neurological exam including motor and sensory exam was performed. The vascular examination including palpation of pulses and capillary refill of the foot was performed and determined to be intact.    Impression & Plan:   It is my impression this patient is doing reasonably well status post total knee replacement.   Her preop range of motion was 20 to 95 degrees and she is currently slightly better than her preop range of motion which is predictable.  I have urged the patient to continue her daily exercise program as it is important to build up her muscles and to work on stretching her hamstrings and increasing her flexion.    Once again I went over both biologic and mechanical prophylaxis for her knee.  Once again I provided her with the antibiotic prophylaxis guide and explained to her the importance of taking her antibiotics for any dental work including the tooth cleaning.    I explained to the patient that I will be retiring at the end of this year.  I have referred the patient Dr. Leobardo Gilmore for follow-up in 2 years for routine check.      Note dictated with [employer] software.  Completed without full type editing and sent to avoid delay.

## 2024-11-11 NOTE — LETTER
November 11, 2024     Ale Callejas MD  [address]  [address]  [facility] [address]    Patient: Megan Wolf   YOB: 1960   Date of Visit: 11/11/2024       Dear Dr. Ale Callejas MD:    Thank you for referring Megan Wolf to me for evaluation. Below are my notes for this consultation.  If you have questions, please do not hesitate to call me. I look forward to following your patient along with you.       Sincerely,     Ian Montero MD      CC: No Recipients  ______________________________________________________________________________________    This 64-year-old woman was seen with her mother for 1/2-month follow-up on her right total knee replacement.  The patient is without complaints at this time not having any pain and has resumed her regular activities including return to work.  Fortunately the patient notes she stopped doing her exercises because she has returned to work.    Review of systems:  A complete review of the patient's past medical history and review of 30 systems and all medications and allergies was performed. Please see adult medical record for details.    A Family history for genetic or familial inheritance issues and Social history including smoking history, alcohol consumption and exercise activities was also reviewed and significant findings noted in the patients history of present illness.    Physical Exam:  The patient is well-nourished and well-developed and in no acute distress. The patient displayed normal mood and affect. The patient's pupils were equal, round sclerae are white. Patient's respirations appear to be regular and unlabored.     Physical examination of the right knee revealed well-healed midline incision, no effusion in the knee and no deformity.  There were no skin abnormalities or lymphangitis. The knee demonstrated normal alignment. There was no tenderness about the knee, thigh or calf. There was no tenderness at the medial or lateral  joint space. There was no pain with patellofemoral compression. The knee came to within 10 degrees of full extension and flexed to 95°. Straight leg raising was without lag.  There was full ligamentous stability.  The neurovascular examination of the extremity was intact.The neurological exam including motor and sensory exam was performed. The vascular examination including palpation of pulses and capillary refill of the foot was performed and determined to be intact.    Impression & Plan:   It is my impression this patient is doing reasonably well status post total knee replacement.  Her preop range of motion was 20 to 95 degrees and she is currently slightly better than her preop range of motion which is predictable.  I have urged the patient to continue her daily exercise program as it is important to build up her muscles and to work on stretching her hamstrings and increasing her flexion.    Once again I went over both biologic and mechanical prophylaxis for her knee.  Once again I provided her with the antibiotic prophylaxis guide and explained to her the importance of taking her antibiotics for any dental work including the tooth cleaning.    I explained to the patient that I will be retiring at the end of this year.  I have referred the patient Dr. Leobardo Gilmore for follow-up in 2 years for routine check.      Note dictated with [employer] software.  Completed without full type editing and sent to avoid delay.

## 2025-01-06 PROCEDURE — RXMED WILLOW AMBULATORY MEDICATION CHARGE

## 2025-01-07 ENCOUNTER — PHARMACY VISIT (OUTPATIENT)
Dept: PHARMACY | Facility: CLINIC | Age: 65
End: 2025-01-07
Payer: COMMERCIAL

## 2025-04-14 PROCEDURE — RXMED WILLOW AMBULATORY MEDICATION CHARGE

## 2025-04-15 ENCOUNTER — PHARMACY VISIT (OUTPATIENT)
Dept: PHARMACY | Facility: CLINIC | Age: 65
End: 2025-04-15
Payer: COMMERCIAL

## 2025-07-09 PROCEDURE — RXMED WILLOW AMBULATORY MEDICATION CHARGE

## 2025-07-10 ENCOUNTER — PHARMACY VISIT (OUTPATIENT)
Dept: PHARMACY | Facility: CLINIC | Age: 65
End: 2025-07-10
Payer: COMMERCIAL

## (undated) DEVICE — Device

## (undated) DEVICE — CLAMP, DRAPE/TUBE, DISPOSABLE, NS

## (undated) DEVICE — SYRINGE, 20 CC, LUER LOCK, MONOJECT, W/O CAP, LF

## (undated) DEVICE — STAPLER, SKIN PROXIMATE, 35 WIDE

## (undated) DEVICE — HOOD, SURGICAL, FLYTE HYBRID

## (undated) DEVICE — DRAPE, SHEET, FAN FOLDED, HALF, 44 X 58 IN, DISPOSABLE, LF, STERILE

## (undated) DEVICE — DRAPE, SHEET, EXTREMITY, W/ARM BOARD COVERS, 87 X 106 X 128 IN, DISPOSABLE, LF, STERILE

## (undated) DEVICE — HOOK, CEMENT, BLUE

## (undated) DEVICE — DRESSING, MEPILEX BORDER, POST-OP AG, 4 X 12 IN

## (undated) DEVICE — COVER, CART, 45 X 27 X 48 IN, CLEAR

## (undated) DEVICE — STOCKINETTE, DOUBLE PLY, 6 X 48 IN, STERILE

## (undated) DEVICE — VEST, SURGEON, PRECEPT, LF

## (undated) DEVICE — CLIPPER, SURGICAL BLADE ASSEMBLY, GENERAL PURPOSE, SINGLE USE

## (undated) DEVICE — GOWN, ASTOUND, XL

## (undated) DEVICE — SUTURE, ETHIBOND EXCEL 1, TAPER POINT CT-1 GREEN 30 INCH

## (undated) DEVICE — MAYO TRAY, LARGE

## (undated) DEVICE — COVER, TABLE, UHC

## (undated) DEVICE — MANIFOLD, 4 PORT NEPTUNE STANDARD

## (undated) DEVICE — NEEDLE, EPIDURAL, TUOHY, 18 G X 3.5 IN

## (undated) DEVICE — BLADE, SAW, SAGITTAL, DUAL CUT, 18 X 90 X 1.27

## (undated) DEVICE — SUTURE, VICRYL, 1, 18 IN, CT-1, VIOLET

## (undated) DEVICE — SUTURE, CTD, VICRYL, 2-0, UND, BR, CT-2

## (undated) DEVICE — APPLICATOR, CHLORAPREP, W/ORANGE TINT, 26ML